# Patient Record
Sex: MALE | Race: WHITE | NOT HISPANIC OR LATINO | Employment: UNEMPLOYED | URBAN - METROPOLITAN AREA
[De-identification: names, ages, dates, MRNs, and addresses within clinical notes are randomized per-mention and may not be internally consistent; named-entity substitution may affect disease eponyms.]

---

## 2017-01-19 ENCOUNTER — ALLSCRIPTS OFFICE VISIT (OUTPATIENT)
Dept: OTHER | Facility: OTHER | Age: 37
End: 2017-01-19

## 2017-01-30 ENCOUNTER — ALLSCRIPTS OFFICE VISIT (OUTPATIENT)
Dept: OTHER | Facility: OTHER | Age: 37
End: 2017-01-30

## 2017-01-30 DIAGNOSIS — I10 ESSENTIAL (PRIMARY) HYPERTENSION: ICD-10-CM

## 2017-01-30 DIAGNOSIS — M54.16 RADICULOPATHY OF LUMBAR REGION: ICD-10-CM

## 2017-01-30 DIAGNOSIS — E55.9 VITAMIN D DEFICIENCY: ICD-10-CM

## 2017-02-06 ENCOUNTER — ALLSCRIPTS OFFICE VISIT (OUTPATIENT)
Dept: OTHER | Facility: OTHER | Age: 37
End: 2017-02-06

## 2017-02-24 ENCOUNTER — TRANSCRIBE ORDERS (OUTPATIENT)
Dept: MRI IMAGING | Facility: CLINIC | Age: 37
End: 2017-02-24
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
## 2018-01-11 NOTE — PROCEDURES
Procedures by Justina Gowers, RN at 9/23/2016  2:04 PM      Author:  Justina Gowers, RN Service:  (none) Author Type:  Registered Nurse     Filed:  9/23/2016  2:10 PM Date of Service:  9/23/2016  2:04 PM Status:  Signed     :  Justina Gowers, RN (Registered Nurse)         Procedure Orders:       1  Insert PICC line [[id number]] ordered by ELEUTERIO Olvera at 09/23/16 1142                    Insert PICC line  Date/Time: 9/23/2016 2:04 PM  Performed by: Eusebio Whitmore by: Francis Dunn     Patient location:  Bedside  Other Assisting Provider:  Yes (comment) (Vita Ivey RN)    Consent:     Consent obtained:  Written    Consent given by:  Parent    Procedural risks discussed: consent obtained by physician  [place] protocol:     Procedure explained and questions answered to patient or proxy's satisfaction: yes      Relevant documents present and verified: yes      Test results available and properly labeled: yes       Imaging studies available: yes      Required blood products, implants, devices, and special equipment available: yes      Site/side marked: yes      Immediately prior to procedure, a time out was called: yes      Patient identity confirmed:  Arm band  Pre-procedure details:     Hand hygiene: Hand hygiene performed prior to insertion      Sterile barrier technique: All elements of maximal sterile technique followed      Skin preparation:  2% chlorhexidine    Skin preparation agent: Skin preparation agent completely dried prior to procedure    Indications:     PICC line indications: vascular access    Anesthesia (see MAR for exact dosages):      Anesthesia method:  Local infiltration    Local anesthetic:  Lidocaine 1% w/o epi  Procedure details:     Location:  [place]    Vessel type: vein      Laterality:  Left    Approach: percutaneous technique used      Patient position:  Flat    Procedural supplies:  Triple lumen    Catheter size:  5 Fr    Landmarks identified: yes      Ultrasound guidance: yes      Sterile ultrasound techniques: Sterile gel and sterile probe covers were used      Number of attempts:  1    Successful placement: yes      Total catheter length (cm):  38cm    Catheter out on skin (cm):  0    Max flow rate:  999ml/hr    Arm circumference:  33cm  Post-procedure details:     Post-procedure:  Dressing applied and securement device placed    Assessment:  Blood return through all ports, free fluid flow and placement verification pending x-ray result    Post-procedure complications: none      Patient tolerance of procedure:   Tolerated well, no immediate complications  Comments:      [place] wire used to assist in placement                     Received for:Provider  EPIC   Sep 23 2016  2:11PM [facility] Standard Time
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
## 2018-01-14 VITALS
BODY MASS INDEX: 32.92 KG/M2 | RESPIRATION RATE: 16 BRPM | HEART RATE: 92 BPM | HEIGHT: 71 IN | WEIGHT: 235.13 LBS | SYSTOLIC BLOOD PRESSURE: 158 MMHG | DIASTOLIC BLOOD PRESSURE: 90 MMHG

## 2018-01-14 VITALS
SYSTOLIC BLOOD PRESSURE: 164 MMHG | HEART RATE: 98 BPM | HEIGHT: 71 IN | TEMPERATURE: 98.9 F | OXYGEN SATURATION: 98 % | DIASTOLIC BLOOD PRESSURE: 100 MMHG | RESPIRATION RATE: 18 BRPM | BODY MASS INDEX: 33.9 KG/M2 | WEIGHT: 242.13 LBS
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
## 2018-01-15 NOTE — MISCELLANEOUS
Message   Recorded as Task   Date: 12/02/2016 10:00 AM, Created By: Sonia Castaneda   Task Name: Call Back   Assigned To: Theresa Sanchez   Regarding Patient: Kim Vargas, Status: Active   Comment:    Theresa Sanchez - 02 Dec 2016 10:00 AM     TASK CREATED  Mother called to report pt has surgery on 12/8 and wanted to have an idea on when he would be able to go out to speech therapy  Suggested he stay in until his 2 week f/u and discuss at that time depending on his status  she was in agreement          Signatures   Electronically signed by : Hoang Rasmussen, ; Dec  2 2016 10:02AM EST                       (Author)
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
## 2018-01-22 VITALS
SYSTOLIC BLOOD PRESSURE: 139 MMHG | BODY MASS INDEX: 31.8 KG/M2 | RESPIRATION RATE: 16 BRPM | HEART RATE: 85 BPM | WEIGHT: 227.13 LBS | HEIGHT: 71 IN | TEMPERATURE: 98.2 F | DIASTOLIC BLOOD PRESSURE: 81 MMHG

## 2018-07-30 ENCOUNTER — DOCUMENTATION (OUTPATIENT)
Dept: INPATIENT UNIT | Facility: HOSPITAL | Age: 38
End: 2018-07-30

## 2020-06-08 ENCOUNTER — HOSPITAL ENCOUNTER (EMERGENCY)
Facility: HOSPITAL | Age: 40
Discharge: HOME | End: 2020-06-08
Attending: EMERGENCY MEDICINE
Payer: COMMERCIAL

## 2020-06-08 VITALS
BODY MASS INDEX: 30.94 KG/M2 | SYSTOLIC BLOOD PRESSURE: 169 MMHG | OXYGEN SATURATION: 98 % | WEIGHT: 221 LBS | HEIGHT: 71 IN | RESPIRATION RATE: 18 BRPM | HEART RATE: 99 BPM | TEMPERATURE: 98 F | DIASTOLIC BLOOD PRESSURE: 91 MMHG

## 2020-06-08 DIAGNOSIS — M54.6 THORACIC SPINE PAIN: Primary | ICD-10-CM

## 2020-06-08 DIAGNOSIS — R51.9 HEAD PAIN: ICD-10-CM

## 2020-06-08 PROCEDURE — 99282 EMERGENCY DEPT VISIT SF MDM: CPT | Performed by: EMERGENCY MEDICINE

## 2020-06-08 PROCEDURE — 99283 EMERGENCY DEPT VISIT LOW MDM: CPT

## 2020-07-13 ENCOUNTER — OFFICE VISIT (OUTPATIENT)
Dept: INTERNAL MEDICINE CLINIC | Facility: CLINIC | Age: 40
End: 2020-07-13
Payer: COMMERCIAL

## 2020-07-13 ENCOUNTER — PATIENT OUTREACH (OUTPATIENT)
Dept: INTERNAL MEDICINE CLINIC | Facility: CLINIC | Age: 40
End: 2020-07-13

## 2020-07-13 ENCOUNTER — TELEPHONE (OUTPATIENT)
Dept: PALLIATIVE MEDICINE | Facility: CLINIC | Age: 40
End: 2020-07-13

## 2020-07-13 VITALS
RESPIRATION RATE: 20 BRPM | SYSTOLIC BLOOD PRESSURE: 140 MMHG | DIASTOLIC BLOOD PRESSURE: 98 MMHG | OXYGEN SATURATION: 97 % | WEIGHT: 235 LBS | HEIGHT: 71 IN | HEART RATE: 121 BPM | TEMPERATURE: 97.9 F | BODY MASS INDEX: 32.9 KG/M2

## 2020-07-13 DIAGNOSIS — H49.02: ICD-10-CM

## 2020-07-13 DIAGNOSIS — Z98.890 S/P CRANIOTOMY: ICD-10-CM

## 2020-07-13 DIAGNOSIS — E78.2 MIXED HYPERLIPIDEMIA: ICD-10-CM

## 2020-07-13 DIAGNOSIS — G91.9 HYDROCEPHALUS, UNSPECIFIED TYPE (HCC): ICD-10-CM

## 2020-07-13 DIAGNOSIS — G89.29 CHRONIC LOW BACK PAIN WITHOUT SCIATICA, UNSPECIFIED BACK PAIN LATERALITY: ICD-10-CM

## 2020-07-13 DIAGNOSIS — F41.9 ANXIETY: ICD-10-CM

## 2020-07-13 DIAGNOSIS — G89.4 CHRONIC PAIN SYNDROME: Primary | ICD-10-CM

## 2020-07-13 DIAGNOSIS — Z11.4 SCREENING FOR HIV (HUMAN IMMUNODEFICIENCY VIRUS): ICD-10-CM

## 2020-07-13 DIAGNOSIS — F90.9 ATTENTION DEFICIT HYPERACTIVITY DISORDER (ADHD), UNSPECIFIED ADHD TYPE: ICD-10-CM

## 2020-07-13 DIAGNOSIS — I10 ESSENTIAL HYPERTENSION: ICD-10-CM

## 2020-07-13 DIAGNOSIS — S02.92XS MULTIPLE CLOSED FRACTURES OF FACIAL BONE, SEQUELA (HCC): ICD-10-CM

## 2020-07-13 DIAGNOSIS — M54.50 CHRONIC LOW BACK PAIN WITHOUT SCIATICA, UNSPECIFIED BACK PAIN LATERALITY: ICD-10-CM

## 2020-07-13 DIAGNOSIS — R56.9 SEIZURES (HCC): ICD-10-CM

## 2020-07-13 DIAGNOSIS — Z87.820 HISTORY OF TRAUMATIC BRAIN INJURY: ICD-10-CM

## 2020-07-13 PROCEDURE — 99204 OFFICE O/P NEW MOD 45 MIN: CPT | Performed by: INTERNAL MEDICINE

## 2020-07-13 PROCEDURE — 3077F SYST BP >= 140 MM HG: CPT | Performed by: INTERNAL MEDICINE

## 2020-07-13 PROCEDURE — 3080F DIAST BP >= 90 MM HG: CPT | Performed by: INTERNAL MEDICINE

## 2020-07-13 PROCEDURE — 93000 ELECTROCARDIOGRAM COMPLETE: CPT | Performed by: INTERNAL MEDICINE

## 2020-07-13 PROCEDURE — 3008F BODY MASS INDEX DOCD: CPT | Performed by: INTERNAL MEDICINE

## 2020-07-13 RX ORDER — GABAPENTIN 100 MG/1
100 CAPSULE ORAL 3 TIMES DAILY
COMMUNITY
End: 2020-08-03 | Stop reason: SINTOL

## 2020-07-13 RX ORDER — AMITRIPTYLINE HYDROCHLORIDE 150 MG/1
150 TABLET, FILM COATED ORAL
COMMUNITY

## 2020-07-13 RX ORDER — HYDROCHLOROTHIAZIDE 12.5 MG/1
12.5 CAPSULE, GELATIN COATED ORAL DAILY
COMMUNITY
End: 2021-09-16 | Stop reason: SDUPTHER

## 2020-07-13 NOTE — PATIENT INSTRUCTIONS
Chronic Hypertension   AMBULATORY CARE:   Hypertension  is high blood pressure (BP)  Your BP is the force of your blood moving against the walls of your arteries  Normal BP is less than 120/80  Prehypertension is between 120/80 and 139/89  Hypertension is 140/90 or higher  Hypertension causes your BP to get so high that your heart has to work much harder than normal  This can damage your heart  Chronic hypertension is a long-term condition that you can control with a healthy lifestyle or medicines  A controlled blood pressure helps protect your organs, such as your heart, lungs, brain, and kidneys  Common symptoms include the following:   · Headache     · Blurred vision    · Chest pain     · Dizziness or weakness     · Trouble breathing     · Nosebleeds  Call 911 for any of the following:   · You have discomfort in your chest that feels like squeezing, pressure, fullness, or pain  · You become confused or have difficulty speaking  · You suddenly feel lightheaded or have trouble breathing  · You have pain or discomfort in your back, neck, jaw, stomach, or arm  Seek care immediately if:   · You have a severe headache or vision loss  · You have weakness in an arm or leg  Contact your healthcare provider if:   · You feel faint, dizzy, confused, or drowsy  · You have been taking your BP medicine and your BP is still higher than your healthcare provider says it should be  · You have questions or concerns about your condition or care  Treatment for chronic hypertension  may include medicine to lower your BP and lower your cholesterol level  A low cholesterol level helps prevent heart disease and makes it easier to control your blood pressure  Heart disease can make your blood pressure harder to control  You may also need to make lifestyle changes  Take your medicine exactly as directed    Manage chronic hypertension:  Talk with your healthcare provider about these and other ways to manage hypertension:  · Take your BP at home  Sit and rest for 5 minutes before you take your BP  Extend your arm and support it on a flat surface  Your arm should be at the same level as your heart  Follow the directions that came with your BP monitor  If possible, take at least 2 BP readings each time  Take your BP at least twice a day at the same times each day, such as morning and evening  Keep a record of your BP readings and bring it to your follow-up visits  Ask your healthcare provider what your blood pressure should be  · Limit sodium (salt) as directed  Too much sodium can affect your fluid balance  Check labels to find low-sodium or no-salt-added foods  Some low-sodium foods use potassium salts for flavor  Too much potassium can also cause health problems  Your healthcare provider will tell you how much sodium and potassium are safe for you to have in a day  He or she may recommend that you limit sodium to 2,300 mg a day  · Follow the meal plan recommended by your healthcare provider  A dietitian or your provider can give you more information on low-sodium plans or the DASH (Dietary Approaches to Stop Hypertension) eating plan  The DASH plan is low in sodium, unhealthy fats, and total fat  It is high in potassium, calcium, and fiber  · Exercise to maintain a healthy weight  Exercise at least 30 minutes per day, on most days of the week  This will help decrease your blood pressure  Ask about the best exercise plan for you  · Decrease stress  This may help lower your BP  Learn ways to relax, such as deep breathing or listening to music  · Limit alcohol  Women should limit alcohol to 1 drink a day  Men should limit alcohol to 2 drinks a day  A drink of alcohol is 12 ounces of beer, 5 ounces of wine, or 1½ ounces of liquor  · Do not smoke  Nicotine and other chemicals in cigarettes and cigars can increase your BP and also cause lung damage   Ask your healthcare provider for information if you currently smoke and need help to quit  E-cigarettes or smokeless tobacco still contain nicotine  Talk to your healthcare provider before you use these products  Follow up with your healthcare provider as directed: You will need to return to have your BP checked and to have other lab tests done  Write down your questions so you remember to ask them during your visits  © 2017 [address] Information is for End User's use only and may not be sold, redistributed or otherwise used for commercial purposes  All illustrations and images included in CareNotes® are the copyrighted property of [employer]  or Jorge Fernandez  The above information is an  only  It is not intended as medical advice for individual conditions or treatments  Talk to your doctor, nurse or pharmacist before following any medical regimen to see if it is safe and effective for you  Obesity   AMBULATORY CARE:   Obesity  is when your body mass index (BMI) is greater than 30  Your healthcare provider will use your height and weight to measure your BMI  The risks of obesity include  many health problems, such as injuries or physical disability  You may need tests to check for the following:  · Diabetes     · High blood pressure or high cholesterol     · Heart disease     · Gallbladder or liver disease     · Cancer of the colon, breast, prostate, liver, or kidney     · Sleep apnea     · Arthritis or gout  Seek care immediately if:   · You have a severe headache, confusion, or difficulty speaking  · You have weakness on one side of your body  · You have chest pain, sweating, or shortness of breath  Contact your healthcare provider if:   · You have symptoms of gallbladder or liver disease, such as pain in your upper abdomen  · You have knee or hip pain and discomfort while walking  · You have symptoms of diabetes, such as intense hunger and thirst, and frequent urination  · You have symptoms of sleep apnea, such as snoring or daytime sleepiness  · You have questions or concerns about your condition or care  Treatment for obesity  focuses on helping you lose weight to improve your health  Even a small decrease in BMI can reduce the risk for many health problems  Your healthcare provider will help you set a weight-loss goal   · Lifestyle changes  are the first step in treating obesity  These include making healthy food choices and getting regular physical activity  Your healthcare provider may suggest a weight-loss program that involves coaching, education, and therapy  · Medicine  may help you lose weight when it is used with a healthy diet and physical activity  · Surgery  can help you lose weight if you are very obese and have other health problems  There are several types of weight-loss surgery  Ask your healthcare provider for more information  Be successful losing weight:   · Set small, realistic goals  An example of a small goal is to walk for 20 minutes 5 days a week  Anther goal is to lose 5% of your body weight  · Tell friends, family members, and coworkers about your goals  and ask for their support  Ask a friend to lose weight with you, or join a weight-loss support group  · Identify foods or triggers that may cause you to overeat , and find ways to avoid them  Remove tempting high-calorie foods from your home and workplace  Place a bowl of fresh fruit on your kitchen counter  If stress causes you to eat, then find other ways to cope with stress  · Keep a diary to track what you eat and drink  Also write down how many minutes of physical activity you do each day  Weigh yourself once a week and record it in your diary  Eating changes: You will need to eat 500 to 1,000 fewer calories each day than you currently eat to lose 1 to 2 pounds a week  The following changes will help you cut calories:  · Eat smaller portions    Use small plates, no larger than 9 inches in diameter  Fill your plate half full of fruits and vegetables  Measure your food using measuring cups until you know what a serving size looks like  · Eat 3 meals and 1 or 2 snacks each day  Plan your meals in advance  Alla Laughter and eat at home most of the time  Eat slowly  · Eat fruits and vegetables at every meal   They are low in calories and high in fiber, which makes you feel full  Do not add butter, margarine, or cream sauce to vegetables  Use herbs to season steamed vegetables  · Eat less fat and fewer fried foods  Eat more baked or grilled chicken and fish  These protein sources are lower in calories and fat than red meat  Limit fast food  Dress your salads with olive oil and vinegar instead of bottled dressing  · Limit the amount of sugar you eat  Do not drink sugary beverages  Limit alcohol  Activity changes:  Physical activity is good for your body in many ways  It helps you burn calories and build strong muscles  It decreases stress and depression, and improves your mood  It can also help you sleep better  Talk to your healthcare provider before you begin an exercise program   · Exercise for at least 30 minutes 5 days a week  Start slowly  Set aside time each day for physical activity that you enjoy and that is convenient for you  It is best to do both weight training and an activity that increases your heart rate, such as walking, bicycling, or swimming  · Find ways to be more active  Do yard work and housecleaning  Walk up the stairs instead of using elevators  Spend your leisure time going to events that require walking, such as outdoor festivals or fairs  This extra physical activity can help you lose weight and keep it off  Follow up with your healthcare provider as directed: You may need to meet with a dietitian  Write down your questions so you remember to ask them during your visits     © 2017 [address] Information is for End User's use only and may not be sold, redistributed or otherwise used for commercial purposes  All illustrations and images included in CareNotes® are the copyrighted property of [employer]  or Jorge Fernandez  The above information is an  only  It is not intended as medical advice for individual conditions or treatments  Talk to your doctor, nurse or pharmacist before following any medical regimen to see if it is safe and effective for you  Low Fat Diet   AMBULATORY CARE:   A low-fat diet  is an eating plan that is low in total fat, unhealthy fat, and cholesterol  You may need to follow a low-fat diet if you have trouble digesting or absorbing fat  You may also need to follow this diet if you have high cholesterol  You can also lower your cholesterol by increasing the amount of fiber in your diet  Soluble fiber is a type of fiber that helps to decrease cholesterol levels  Different types of fat in food:   · Limit unhealthy fats  A diet that is high in cholesterol, saturated fat, and trans fat may cause unhealthy cholesterol levels  Unhealthy cholesterol levels increase your risk of heart disease  ¨ Cholesterol:  Limit intake of cholesterol to less than 200 mg per day  Cholesterol is found in meat, eggs, and dairy  ¨ Saturated fat:  Limit saturated fat to less than 7% of your total daily calories  Ask your dietitian how many calories you need each day  Saturated fat is found in butter, cheese, ice cream, whole milk, and palm oil  Saturated fat is also found in meat, such as beef, pork, chicken skin, and processed meats  Processed meats include sausage, hot dogs, and bologna  ¨ Trans fat:  Avoid trans fat as much as possible  Trans fat is used in fried and baked foods  Foods that say trans fat free on the label may still have up to 0 5 grams of trans fat per serving  · Include healthy fats  Replace foods that are high in saturated and trans fat with foods high in healthy fats  This may help to decrease high cholesterol levels  ¨ Monounsaturated fats: These are found in avocados, nuts, and vegetable oils, such as olive, canola, and sunflower oil  ¨ Polyunsaturated fats: These can be found in vegetable oils, such as soybean or corn oil  Omega-3 fats can help to decrease the risk of heart disease  Omega-3 fats are found in fish, such as salmon, herring, trout, and tuna  Omega-3 fats can also be found in plant foods, such as walnuts, flaxseed, soybeans, and canola oil    Foods to limit or avoid:   · Grains:      ¨ Snacks that are made with partially hydrogenated oils, such as chips, regular crackers, and butter-flavored popcorn    ¨ High-fat baked goods, such as biscuits, croissants, doughnuts, pies, cookies, and pastries    · Dairy:      ¨ Whole milk, 2% milk, and yogurt and ice cream made with whole milk    ¨ Half and half creamer, heavy cream, and whipping cream    ¨ Cheese, cream cheese, and sour cream    · Meats and proteins:      ¨ High-fat cuts of meat (T-bone steak, regular hamburger, and ribs)    ¨ Fried meat, poultry (turkey and chicken), and fish    ¨ Poultry (chicken and turkey) with skin    ¨ Cold cuts (salami or bologna), hot dogs, kim, and sausage    ¨ Whole eggs and egg yolks    · Vegetables and fruits with added fat:      ¨ Fried vegetables or vegetables in butter or high-fat sauces, such as cream or cheese sauces    ¨ Fried fruit or fruit served with butter or cream    · Fats:      ¨ Butter, stick margarine, and shortening    ¨ Coconut, palm oil, and palm kernel oil  Foods to include:   · Grains:      ¨ Whole-grain breads, cereals, pasta, and brown rice    ¨ Low-fat crackers and pretzels    · Vegetables and fruits:      ¨ Fresh, frozen, or canned vegetables (no salt or low-sodium)    ¨ Fresh, frozen, dried, or canned fruit (canned in light syrup or fruit juice)    ¨ Avocado    · Low-fat dairy products:      ¨ Nonfat (skim) or 1% milk    ¨ Nonfat or low-fat cheese, yogurt, and cottage cheese    · Meats and proteins:      ¨ Chicken or turkey with no skin    ¨ Baked or broiled fish    ¨ Lean beef and pork (loin, round, extra lean hamburger)    ¨ Beans and peas, unsalted nuts, soy products    ¨ Egg whites and substitutes    ¨ Seeds and nuts    · Fats:      ¨ Unsaturated oil, such as canola, olive, peanut, soybean, or sunflower oil    ¨ Soft or liquid margarine and vegetable oil spread    ¨ Low-fat salad dressing  Other ways to decrease fat:   · Read food labels before you buy foods  Choose foods that have less than 30% of calories from fat  Choose low-fat or fat-free dairy products  Remember that fat free does not mean calorie free  These foods still contain calories, and too many calories can lead to weight gain  · Trim fat from meat and avoid fried food  Trim all visible fat from meat before you cook it  Remove the skin from poultry  Do not [place] meat, fish, or poultry  Bake, roast, boil, or broil these foods instead  Avoid fried foods  Eat a baked potato instead of [place] fries  Steam vegetables instead of sautéing them in butter  · Add less fat to foods  Use imitation kim bits on salads and baked potatoes instead of regular kim bits  Use fat-free or low-fat salad dressings instead of regular dressings  Use low-fat or nonfat butter-flavored topping instead of regular butter or margarine on popcorn and other foods  Ways to decrease fat in recipes:  Replace high-fat ingredients with low-fat or nonfat ones  This may cause baked goods to be drier than usual  You may need to use nonfat cooking spray on pans to prevent food from sticking  You also may need to change the amount of other ingredients, such as water, in the recipe  Try the following:  · Use low-fat or light margarine instead of regular margarine or shortening  · Use lean ground turkey breast or chicken, or lean ground beef (less than 5% fat) instead of hamburger       · Add 1 teaspoon of canola oil to 8 ounces of skim milk instead of using cream or half and half  · Use grated zucchini, carrots, or apples in breads instead of coconut  · Use blenderized, low-fat cottage cheese, plain tofu, or low-fat ricotta cheese instead of cream cheese  · Use 1 egg white and 1 teaspoon of canola oil, or use ¼ cup (2 ounces) of fat-free egg substitute instead of a whole egg  · Replace half of the oil that is called for in a recipe with applesauce when you bake  Use 3 tablespoons of cocoa powder and 1 tablespoon of canola oil instead of a square of baking chocolate  How to increase fiber:  Eat enough high-fiber foods to get 20 to 30 grams of fiber every day  Slowly increase your fiber intake to avoid stomach cramps, gas, and other problems  · Eat 3 ounces of whole-grain foods each day  An ounce is about 1 slice of bread  Eat whole-grain breads, such as whole-wheat bread  Whole wheat, whole-wheat flour, or other whole grains should be listed as the first ingredient on the food label  Replace white flour with whole-grain flour or use half of each in recipes  Whole-grain flour is heavier than white flour, so you may have to add more yeast or baking powder  · Eat a high-fiber cereal for breakfast   Oatmeal is a good source of soluble fiber  Look for cereals that have bran or fiber in the name  Choose whole-grain products, such as brown rice, barley, and whole-wheat pasta  · Eat more beans, peas, and lentils  For example, add beans to soups or salads  Eat at least 5 cups of fruits and vegetables each day  Eat fruits and vegetables with the peel because the peel is high in fiber  © 2017 [address]  Information is for End User's use only and may not be sold, redistributed or otherwise used for commercial purposes  All illustrations and images included in CareNotes® are the copyrighted property of [employer]  or Jorge Fernandez  The above information is an  only   It is not intended as medical advice for individual conditions or treatments  Talk to your doctor, nurse or pharmacist before following any medical regimen to see if it is safe and effective for you  Heart Healthy Diet   AMBULATORY CARE:   A heart healthy diet  is an eating plan low in total fat, unhealthy fats, and sodium (salt)  A heart healthy diet helps decrease your risk for heart disease and stroke  Limit the amount of fat you eat to 25% to 35% of your total daily calories  Limit sodium to less than 2,300 mg each day  Healthy fats:  Healthy fats can help improve cholesterol levels  The risk for heart disease is decreased when cholesterol levels are normal  Choose healthy fats, such as the following:  · Unsaturated fat  is found in foods such as soybean, canola, olive, corn, and safflower oils  It is also found in soft tub margarine that is made with liquid vegetable oil  · Omega-3 fat  is found in certain fish, such as salmon, tuna, and trout, and in walnuts and flaxseed  Unhealthy fats:  Unhealthy fats can cause unhealthy cholesterol levels in your blood and increase your risk of heart disease  Limit unhealthy fats, such as the following:  · Cholesterol  is found in animal foods, such as eggs and lobster, and in dairy products made from whole milk  Limit cholesterol to less than 300 milligrams (mg) each day  You may need to limit cholesterol to 200 mg each day if you have heart disease  · Saturated fat  is found in meats, such as kim and hamburger  It is also found in chicken or turkey skin, whole milk, and butter  Limit saturated fat to less than 7% of your total daily calories  Limit saturated fat to less than 6% if you have heart disease or are at increased risk for it  · Trans fat  is found in packaged foods, such as potato chips and cookies  It is also in hard margarine, some fried foods, and shortening  Avoid trans fats as much as possible    Heart healthy foods and drinks to include:  Ask your dietitian or healthcare provider how many servings to have from each of the following food groups:  · Grains:      ¨ Whole-wheat breads, cereals, and pastas, and brown rice    ¨ Low-fat, low-sodium crackers and chips    · Vegetables:      ¨ Broccoli, green beans, green peas, and spinach    ¨ Collards, kale, and lima beans    ¨ Carrots, sweet potatoes, tomatoes, and peppers    ¨ Canned vegetables with no salt added    · Fruits:      ¨ Bananas, peaches, pears, and pineapple    ¨ Grapes, raisins, and dates    ¨ Oranges, tangerines, grapefruit, orange juice, and grapefruit juice    ¨ Apricots, mangoes, melons, and papaya    ¨ Raspberries and strawberries    ¨ Canned fruit with no added sugar    · Low-fat dairy products:      ¨ Nonfat (skim) milk, 1% milk, and low-fat almond, cashew, or soy milks fortified with calcium    ¨ Low-fat cheese, regular or frozen yogurt, and cottage cheese    · Meats and proteins , such as lean cuts of beef and pork (loin, leg, round), skinless chicken and turkey, legumes, soy products, egg whites, and nuts  Foods and drinks to limit or avoid:  Ask your dietitian or healthcare provider about these and other foods that are high in unhealthy fat, sodium, and sugar:  · Snack or packaged foods , such as frozen dinners, cookies, macaroni and cheese, and cereals with more than 300 mg of sodium per serving    · Canned or dry mixes  for cakes, soups, sauces, or gravies    · Vegetables with added sodium , such as instant potatoes, vegetables with added sauces, or regular canned vegetables    · Other foods high in sodium , such as ketchup, barbecue sauce, salad dressing, pickles, olives, soy sauce, and miso    · High-fat dairy foods  such as whole or 2% milk, cream cheese, or sour cream, and cheeses     · High-fat protein foods  such as high-fat cuts of beef (T-bone steaks, ribs), chicken or turkey with skin, and organ meats, such as liver    · Cured or smoked meats , such as hot dogs, kim, and sausage    · Unhealthy fats and oils , such as butter, stick margarine, shortening, and cooking oils such as coconut or palm oil    · Food and drinks high in sugar , such as soft drinks (soda), sports drinks, sweetened tea, candy, cake, cookies, pies, and doughnuts  Other diet guidelines to follow:   · Eat more foods containing omega-3 fats  Eat fish high in omega-3 fats at least 2 times a week  · Limit alcohol  Too much alcohol can damage your heart and raise your blood pressure  Women should limit alcohol to 1 drink a day  Men should limit alcohol to 2 drinks a day  A drink of alcohol is 12 ounces of beer, 5 ounces of wine, or 1½ ounces of liquor  · Choose low-sodium foods  High-sodium foods can lead to high blood pressure  Add little or no salt to food you prepare  Use herbs and spices in place of salt  · Eat more fiber  to help lower cholesterol levels  Eat at least 5 servings of fruits and vegetables each day  Eat 3 ounces of whole-grain foods each day  Legumes (beans) are also a good source of fiber  Lifestyle guidelines:   · Do not smoke  Nicotine and other chemicals in cigarettes and cigars can cause lung and heart damage  Ask your healthcare provider for information if you currently smoke and need help to quit  E-cigarettes or smokeless tobacco still contain nicotine  Talk to your healthcare provider before you use these products  · Exercise regularly  to help you maintain a healthy weight and improve your blood pressure and cholesterol levels  Ask your healthcare provider about the best exercise plan for you  Do not start an exercise program without asking your healthcare provider  Follow up with your healthcare provider as directed:  Write down your questions so you remember to ask them during your visits  © 2017 [facility] Information is for End User's use only and may not be sold, redistributed or otherwise used for commercial purposes   All illustrations and images included in CareNotes® are the copyrighted property of [employer]  or Jorge Fernandez  The above information is an  only  It is not intended as medical advice for individual conditions or treatments  Talk to your doctor, nurse or pharmacist before following any medical regimen to see if it is safe and effective for you  Obesity   AMBULATORY CARE:   Obesity  is when your body mass index (BMI) is greater than 30  Your healthcare provider will use your height and weight to measure your BMI  The risks of obesity include  many health problems, such as injuries or physical disability  You may need tests to check for the following:  · Diabetes     · High blood pressure or high cholesterol     · Heart disease     · Gallbladder or liver disease     · Cancer of the colon, breast, prostate, liver, or kidney     · Sleep apnea     · Arthritis or gout  Seek care immediately if:   · You have a severe headache, confusion, or difficulty speaking  · You have weakness on one side of your body  · You have chest pain, sweating, or shortness of breath  Contact your healthcare provider if:   · You have symptoms of gallbladder or liver disease, such as pain in your upper abdomen  · You have knee or hip pain and discomfort while walking  · You have symptoms of diabetes, such as intense hunger and thirst, and frequent urination  · You have symptoms of sleep apnea, such as snoring or daytime sleepiness  · You have questions or concerns about your condition or care  Treatment for obesity  focuses on helping you lose weight to improve your health  Even a small decrease in BMI can reduce the risk for many health problems  Your healthcare provider will help you set a weight-loss goal   · Lifestyle changes  are the first step in treating obesity  These include making healthy food choices and getting regular physical activity   Your healthcare provider may suggest a weight-loss program that involves coaching, education, and therapy  · Medicine  may help you lose weight when it is used with a healthy diet and physical activity  · Surgery  can help you lose weight if you are very obese and have other health problems  There are several types of weight-loss surgery  Ask your healthcare provider for more information  Be successful losing weight:   · Set small, realistic goals  An example of a small goal is to walk for 20 minutes 5 days a week  Anther goal is to lose 5% of your body weight  · Tell friends, family members, and coworkers about your goals  and ask for their support  Ask a friend to lose weight with you, or join a weight-loss support group  · Identify foods or triggers that may cause you to overeat , and find ways to avoid them  Remove tempting high-calorie foods from your home and workplace  Place a bowl of fresh fruit on your kitchen counter  If stress causes you to eat, then find other ways to cope with stress  · Keep a diary to track what you eat and drink  Also write down how many minutes of physical activity you do each day  Weigh yourself once a week and record it in your diary  Eating changes: You will need to eat 500 to 1,000 fewer calories each day than you currently eat to lose 1 to 2 pounds a week  The following changes will help you cut calories:  · Eat smaller portions  Use small plates, no larger than 9 inches in diameter  Fill your plate half full of fruits and vegetables  Measure your food using measuring cups until you know what a serving size looks like  · Eat 3 meals and 1 or 2 snacks each day  Plan your meals in advance  Eliza Torres and eat at home most of the time  Eat slowly  · Eat fruits and vegetables at every meal   They are low in calories and high in fiber, which makes you feel full  Do not add butter, margarine, or cream sauce to vegetables  Use herbs to season steamed vegetables  · Eat less fat and fewer fried foods    Eat more baked or grilled chicken and fish  These protein sources are lower in calories and fat than red meat  Limit fast food  Dress your salads with olive oil and vinegar instead of bottled dressing  · Limit the amount of sugar you eat  Do not drink sugary beverages  Limit alcohol  Activity changes:  Physical activity is good for your body in many ways  It helps you burn calories and build strong muscles  It decreases stress and depression, and improves your mood  It can also help you sleep better  Talk to your healthcare provider before you begin an exercise program   · Exercise for at least 30 minutes 5 days a week  Start slowly  Set aside time each day for physical activity that you enjoy and that is convenient for you  It is best to do both weight training and an activity that increases your heart rate, such as walking, bicycling, or swimming  · Find ways to be more active  Do yard work and housecleaning  Walk up the stairs instead of using elevators  Spend your leisure time going to events that require walking, such as outdoor festivals or fairs  This extra physical activity can help you lose weight and keep it off  Follow up with your healthcare provider as directed: You may need to meet with a dietitian  Write down your questions so you remember to ask them during your visits  © 2017 [address] Information is for End User's use only and may not be sold, redistributed or otherwise used for commercial purposes  All illustrations and images included in CareNotes® are the copyrighted property of [email or web address]  or Jorge Fernandez  The above information is an  only  It is not intended as medical advice for individual conditions or treatments  Talk to your doctor, nurse or pharmacist before following any medical regimen to see if it is safe and effective for you      Low Fat Diet   AMBULATORY CARE:   A low-fat diet  is an eating plan that is low in total fat, unhealthy fat, and cholesterol  You may need to follow a low-fat diet if you have trouble digesting or absorbing fat  You may also need to follow this diet if you have high cholesterol  You can also lower your cholesterol by increasing the amount of fiber in your diet  Soluble fiber is a type of fiber that helps to decrease cholesterol levels  Different types of fat in food:   · Limit unhealthy fats  A diet that is high in cholesterol, saturated fat, and trans fat may cause unhealthy cholesterol levels  Unhealthy cholesterol levels increase your risk of heart disease  ¨ Cholesterol:  Limit intake of cholesterol to less than 200 mg per day  Cholesterol is found in meat, eggs, and dairy  ¨ Saturated fat:  Limit saturated fat to less than 7% of your total daily calories  Ask your dietitian how many calories you need each day  Saturated fat is found in butter, cheese, ice cream, whole milk, and palm oil  Saturated fat is also found in meat, such as beef, pork, chicken skin, and processed meats  Processed meats include sausage, hot dogs, and bologna  ¨ Trans fat:  Avoid trans fat as much as possible  Trans fat is used in fried and baked foods  Foods that say trans fat free on the label may still have up to 0 5 grams of trans fat per serving  · Include healthy fats  Replace foods that are high in saturated and trans fat with foods high in healthy fats  This may help to decrease high cholesterol levels  ¨ Monounsaturated fats: These are found in avocados, nuts, and vegetable oils, such as olive, canola, and sunflower oil  ¨ Polyunsaturated fats: These can be found in vegetable oils, such as soybean or corn oil  Omega-3 fats can help to decrease the risk of heart disease  Omega-3 fats are found in fish, such as salmon, herring, trout, and tuna  Omega-3 fats can also be found in plant foods, such as walnuts, flaxseed, soybeans, and canola oil    Foods to limit or avoid:   · Grains:      ¨ Snacks that are made with partially hydrogenated oils, such as chips, regular crackers, and butter-flavored popcorn    ¨ High-fat baked goods, such as biscuits, croissants, doughnuts, pies, cookies, and pastries    · Dairy:      ¨ Whole milk, 2% milk, and yogurt and ice cream made with whole milk    ¨ Half and half creamer, heavy cream, and whipping cream    ¨ Cheese, cream cheese, and sour cream    · Meats and proteins:      ¨ High-fat cuts of meat (T-bone steak, regular hamburger, and ribs)    ¨ Fried meat, poultry (turkey and chicken), and fish    ¨ Poultry (chicken and turkey) with skin    ¨ Cold cuts (salami or bologna), hot dogs, kim, and sausage    ¨ Whole eggs and egg yolks    · Vegetables and fruits with added fat:      ¨ Fried vegetables or vegetables in butter or high-fat sauces, such as cream or cheese sauces    ¨ Fried fruit or fruit served with butter or cream    · Fats:      ¨ Butter, stick margarine, and shortening    ¨ Coconut, palm oil, and palm kernel oil  Foods to include:   · Grains:      ¨ Whole-grain breads, cereals, pasta, and brown rice    ¨ Low-fat crackers and pretzels    · Vegetables and fruits:      ¨ Fresh, frozen, or canned vegetables (no salt or low-sodium)    ¨ Fresh, frozen, dried, or canned fruit (canned in light syrup or fruit juice)    ¨ Avocado    · Low-fat dairy products:      ¨ Nonfat (skim) or 1% milk    ¨ Nonfat or low-fat cheese, yogurt, and cottage cheese    · Meats and proteins:      ¨ Chicken or turkey with no skin    ¨ Baked or broiled fish    ¨ Lean beef and pork (loin, round, extra lean hamburger)    ¨ Beans and peas, unsalted nuts, soy products    ¨ Egg whites and substitutes    ¨ Seeds and nuts    · Fats:      ¨ Unsaturated oil, such as canola, olive, peanut, soybean, or sunflower oil    ¨ Soft or liquid margarine and vegetable oil spread    ¨ Low-fat salad dressing  Other ways to decrease fat:   · Read food labels before you buy foods    Choose foods that have less than 30% of calories from fat  Choose low-fat or fat-free dairy products  Remember that fat free does not mean calorie free  These foods still contain calories, and too many calories can lead to weight gain  · Trim fat from meat and avoid fried food  Trim all visible fat from meat before you cook it  Remove the skin from poultry  Do not [place] meat, fish, or poultry  Bake, roast, boil, or broil these foods instead  Avoid fried foods  Eat a baked potato instead of [place] fries  Steam vegetables instead of sautéing them in butter  · Add less fat to foods  Use imitation kim bits on salads and baked potatoes instead of regular kim bits  Use fat-free or low-fat salad dressings instead of regular dressings  Use low-fat or nonfat butter-flavored topping instead of regular butter or margarine on popcorn and other foods  Ways to decrease fat in recipes:  Replace high-fat ingredients with low-fat or nonfat ones  This may cause baked goods to be drier than usual  You may need to use nonfat cooking spray on pans to prevent food from sticking  You also may need to change the amount of other ingredients, such as water, in the recipe  Try the following:  · Use low-fat or light margarine instead of regular margarine or shortening  · Use lean ground turkey breast or chicken, or lean ground beef (less than 5% fat) instead of hamburger  · Add 1 teaspoon of canola oil to 8 ounces of skim milk instead of using cream or half and half  · Use grated zucchini, carrots, or apples in breads instead of coconut  · Use blenderized, low-fat cottage cheese, plain tofu, or low-fat ricotta cheese instead of cream cheese  · Use 1 egg white and 1 teaspoon of canola oil, or use ¼ cup (2 ounces) of fat-free egg substitute instead of a whole egg  · Replace half of the oil that is called for in a recipe with applesauce when you bake   Use 3 tablespoons of cocoa powder and 1 tablespoon of canola oil instead of a square of baking chocolate  How to increase fiber:  Eat enough high-fiber foods to get 20 to 30 grams of fiber every day  Slowly increase your fiber intake to avoid stomach cramps, gas, and other problems  · Eat 3 ounces of whole-grain foods each day  An ounce is about 1 slice of bread  Eat whole-grain breads, such as whole-wheat bread  Whole wheat, whole-wheat flour, or other whole grains should be listed as the first ingredient on the food label  Replace white flour with whole-grain flour or use half of each in recipes  Whole-grain flour is heavier than white flour, so you may have to add more yeast or baking powder  · Eat a high-fiber cereal for breakfast   Oatmeal is a good source of soluble fiber  Look for cereals that have bran or fiber in the name  Choose whole-grain products, such as brown rice, barley, and whole-wheat pasta  · Eat more beans, peas, and lentils  For example, add beans to soups or salads  Eat at least 5 cups of fruits and vegetables each day  Eat fruits and vegetables with the peel because the peel is high in fiber  © 2017 [address]  Information is for End User's use only and may not be sold, redistributed or otherwise used for commercial purposes  All illustrations and images included in CareNotes® are the copyrighted property of [employer]  or Jorge Fernandez  The above information is an  only  It is not intended as medical advice for individual conditions or treatments  Talk to your doctor, nurse or pharmacist before following any medical regimen to see if it is safe and effective for you  Heart Healthy Diet   AMBULATORY CARE:   A heart healthy diet  is an eating plan low in total fat, unhealthy fats, and sodium (salt)  A heart healthy diet helps decrease your risk for heart disease and stroke  Limit the amount of fat you eat to 25% to 35% of your total daily calories  Limit sodium to less than 2,300 mg each day     Healthy fats:  Healthy fats can help improve cholesterol levels  The risk for heart disease is decreased when cholesterol levels are normal  Choose healthy fats, such as the following:  · Unsaturated fat  is found in foods such as soybean, canola, olive, corn, and safflower oils  It is also found in soft tub margarine that is made with liquid vegetable oil  · Omega-3 fat  is found in certain fish, such as salmon, tuna, and trout, and in walnuts and flaxseed  Unhealthy fats:  Unhealthy fats can cause unhealthy cholesterol levels in your blood and increase your risk of heart disease  Limit unhealthy fats, such as the following:  · Cholesterol  is found in animal foods, such as eggs and lobster, and in dairy products made from whole milk  Limit cholesterol to less than 300 milligrams (mg) each day  You may need to limit cholesterol to 200 mg each day if you have heart disease  · Saturated fat  is found in meats, such as kim and hamburger  It is also found in chicken or turkey skin, whole milk, and butter  Limit saturated fat to less than 7% of your total daily calories  Limit saturated fat to less than 6% if you have heart disease or are at increased risk for it  · Trans fat  is found in packaged foods, such as potato chips and cookies  It is also in hard margarine, some fried foods, and shortening  Avoid trans fats as much as possible    Heart healthy foods and drinks to include:  Ask your dietitian or healthcare provider how many servings to have from each of the following food groups:  · Grains:      ¨ Whole-wheat breads, cereals, and pastas, and brown rice    ¨ Low-fat, low-sodium crackers and chips    · Vegetables:      ¨ Broccoli, green beans, green peas, and spinach    ¨ Collards, kale, and lima beans    ¨ Carrots, sweet potatoes, tomatoes, and peppers    ¨ Canned vegetables with no salt added    · Fruits:      ¨ Bananas, peaches, pears, and pineapple    ¨ Grapes, raisins, and dates    ¨ Oranges, tangerines, grapefruit, orange juice, and grapefruit juice    ¨ Apricots, mangoes, melons, and papaya    ¨ Raspberries and strawberries    ¨ Canned fruit with no added sugar    · Low-fat dairy products:      ¨ Nonfat (skim) milk, 1% milk, and low-fat almond, cashew, or soy milks fortified with calcium    ¨ Low-fat cheese, regular or frozen yogurt, and cottage cheese    · Meats and proteins , such as lean cuts of beef and pork (loin, leg, round), skinless chicken and turkey, legumes, soy products, egg whites, and nuts  Foods and drinks to limit or avoid:  Ask your dietitian or healthcare provider about these and other foods that are high in unhealthy fat, sodium, and sugar:  · Snack or packaged foods , such as frozen dinners, cookies, macaroni and cheese, and cereals with more than 300 mg of sodium per serving    · Canned or dry mixes  for cakes, soups, sauces, or gravies    · Vegetables with added sodium , such as instant potatoes, vegetables with added sauces, or regular canned vegetables    · Other foods high in sodium , such as ketchup, barbecue sauce, salad dressing, pickles, olives, soy sauce, and miso    · High-fat dairy foods  such as whole or 2% milk, cream cheese, or sour cream, and cheeses     · High-fat protein foods  such as high-fat cuts of beef (T-bone steaks, ribs), chicken or turkey with skin, and organ meats, such as liver    · Cured or smoked meats , such as hot dogs, kim, and sausage    · Unhealthy fats and oils , such as butter, stick margarine, shortening, and cooking oils such as coconut or palm oil    · Food and drinks high in sugar , such as soft drinks (soda), sports drinks, sweetened tea, candy, cake, cookies, pies, and doughnuts  Other diet guidelines to follow:   · Eat more foods containing omega-3 fats  Eat fish high in omega-3 fats at least 2 times a week  · Limit alcohol  Too much alcohol can damage your heart and raise your blood pressure  Women should limit alcohol to 1 drink a day   Men should limit alcohol to 2 drinks a day  A drink of alcohol is 12 ounces of beer, 5 ounces of wine, or 1½ ounces of liquor  · Choose low-sodium foods  High-sodium foods can lead to high blood pressure  Add little or no salt to food you prepare  Use herbs and spices in place of salt  · Eat more fiber  to help lower cholesterol levels  Eat at least 5 servings of fruits and vegetables each day  Eat 3 ounces of whole-grain foods each day  Legumes (beans) are also a good source of fiber  Lifestyle guidelines:   · Do not smoke  Nicotine and other chemicals in cigarettes and cigars can cause lung and heart damage  Ask your healthcare provider for information if you currently smoke and need help to quit  E-cigarettes or smokeless tobacco still contain nicotine  Talk to your healthcare provider before you use these products  · Exercise regularly  to help you maintain a healthy weight and improve your blood pressure and cholesterol levels  Ask your healthcare provider about the best exercise plan for you  Do not start an exercise program without asking your healthcare provider  Follow up with your healthcare provider as directed:  Write down your questions so you remember to ask them during your visits  © 2017 [address] [facility] Information is for End User's use only and may not be sold, redistributed or otherwise used for commercial purposes  All illustrations and images included in CareNotes® are the copyrighted property of [employer]  or Jorge Fernandez  The above information is an  only  It is not intended as medical advice for individual conditions or treatments  Talk to your doctor, nurse or pharmacist before following any medical regimen to see if it is safe and effective for you

## 2020-07-13 NOTE — PROGRESS NOTES
Outpatient Care Management Note:  Received referral from PCP  Chart review completed  Reached out to palliative care to make them aware of the referral as requested by PCP

## 2020-07-13 NOTE — TELEPHONE ENCOUNTER
Dr Rubén Leger referring pt to [address]  Please review chart to help determine if pt is Palliative Care appropriate      Thank you
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
## 2020-07-14 NOTE — TELEPHONE ENCOUNTER
Patient is not appropriate for palliative care  He should pursue referral to pain management for his chronic pain  Thank you

## 2020-07-16 LAB
CREAT ?TM UR-SCNC: 135 UMOL/L
EXT MICROALBUMIN URINE RANDOM: 1.2
EXTERNAL HIV CONFIRMATION: NORMAL
EXTERNAL HIV SCREEN: NORMAL
MICROALBUMIN/CREAT UR: 9 MG/G{CREAT}

## 2020-07-17 ENCOUNTER — PATIENT OUTREACH (OUTPATIENT)
Dept: INTERNAL MEDICINE CLINIC | Facility: CLINIC | Age: 40
End: 2020-07-17

## 2020-07-17 NOTE — PROGRESS NOTES
I called patient spoke with patient mother gave Dr Linda Newell recommendation she states will speak to son and call us back if son wants to schedule
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
## 2020-07-24 ENCOUNTER — OFFICE VISIT (OUTPATIENT)
Dept: PAIN MEDICINE | Facility: CLINIC | Age: 40
End: 2020-07-24
Payer: COMMERCIAL

## 2020-07-24 ENCOUNTER — TELEPHONE (OUTPATIENT)
Dept: PAIN MEDICINE | Facility: CLINIC | Age: 40
End: 2020-07-24

## 2020-07-24 VITALS
WEIGHT: 242.4 LBS | HEIGHT: 71 IN | RESPIRATION RATE: 20 BRPM | DIASTOLIC BLOOD PRESSURE: 102 MMHG | BODY MASS INDEX: 33.94 KG/M2 | SYSTOLIC BLOOD PRESSURE: 172 MMHG | HEART RATE: 107 BPM

## 2020-07-24 DIAGNOSIS — Z98.890 S/P CRANIOTOMY: ICD-10-CM

## 2020-07-24 DIAGNOSIS — G89.4 CHRONIC PAIN SYNDROME: Primary | ICD-10-CM

## 2020-07-24 DIAGNOSIS — F11.20 UNCOMPLICATED OPIOID DEPENDENCE (HCC): ICD-10-CM

## 2020-07-24 DIAGNOSIS — Z79.891 LONG-TERM CURRENT USE OF OPIATE ANALGESIC: ICD-10-CM

## 2020-07-24 PROCEDURE — 80305 DRUG TEST PRSMV DIR OPT OBS: CPT | Performed by: ANESTHESIOLOGY

## 2020-07-24 PROCEDURE — 3008F BODY MASS INDEX DOCD: CPT | Performed by: ANESTHESIOLOGY

## 2020-07-24 PROCEDURE — 99213 OFFICE O/P EST LOW 20 MIN: CPT | Performed by: ANESTHESIOLOGY

## 2020-07-24 PROCEDURE — 3077F SYST BP >= 140 MM HG: CPT | Performed by: ANESTHESIOLOGY

## 2020-07-24 PROCEDURE — 3080F DIAST BP >= 90 MM HG: CPT | Performed by: ANESTHESIOLOGY

## 2020-07-24 RX ORDER — MORPHINE SULFATE 15 MG/1
15 TABLET ORAL EVERY 6 HOURS PRN
COMMUNITY
End: 2020-11-20

## 2020-07-24 RX ORDER — OXYCODONE HYDROCHLORIDE AND ACETAMINOPHEN 5; 325 MG/1; MG/1
1 TABLET ORAL EVERY 6 HOURS PRN
COMMUNITY
End: 2020-07-24 | Stop reason: SDUPTHER

## 2020-07-24 RX ORDER — OXYCODONE HYDROCHLORIDE AND ACETAMINOPHEN 5; 325 MG/1; MG/1
1 TABLET ORAL EVERY 8 HOURS PRN
Qty: 45 TABLET | Refills: 0 | Status: SHIPPED | OUTPATIENT
Start: 2020-07-24 | End: 2020-08-07 | Stop reason: SDUPTHER

## 2020-07-24 RX ORDER — OXYCODONE HYDROCHLORIDE 5 MG/1
5 TABLET ORAL EVERY 4 HOURS PRN
COMMUNITY
End: 2020-07-24

## 2020-07-24 NOTE — TELEPHONE ENCOUNTER
Mother states that since beginning gabapentin she has noticed patient repeats himself a lot and wanted SI to be aware   No other side effects reported
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
## 2020-07-24 NOTE — TELEPHONE ENCOUNTER
Patient's mom calling to confirm should he continue to take Gabapentin now that he is going to start taking   oxyCODONE-acetaminophen (PERCOCET) 5-325 mg per tablet       Please advise  Thank you       [phone]

## 2020-07-24 NOTE — TELEPHONE ENCOUNTER
S/w mother per MADISON  Advised that gabapentin should not be abruptly stopped and ov note does not state that med was discontinued  Advised pt should continue on as ordered until SI returns to office Monday  Mother verbalized understanding  Do you want pt to continue on or wean off at this time?

## 2020-07-26 NOTE — TELEPHONE ENCOUNTER
We can try it again and see how he does   If any untoward side effects persist, we will consider tapering down or perhaps d/c

## 2020-07-27 NOTE — TELEPHONE ENCOUNTER
S/w mother and advised same  Pt will continue on as ordered and cb if any side effects  Pt will f/u as schedued with [employer]

## 2020-07-28 LAB
6MAM UR QL CFM: NEGATIVE NG/ML
7AMINOCLONAZEPAM UR QL CFM: NEGATIVE NG/ML
A-OH ALPRAZ UR QL CFM: NEGATIVE NG/ML
ACCEPTABLE CREAT UR QL: NORMAL MG/DL
ACCEPTIBLE SP GR UR QL: NORMAL
AMPHET UR QL CFM: NEGATIVE NG/ML
AMPHET UR QL CFM: NEGATIVE NG/ML
BUPRENORPHINE UR QL CFM: NEGATIVE NG/ML
BUTALBITAL UR QL CFM: NEGATIVE NG/ML
BZE UR QL CFM: NEGATIVE NG/ML
CODEINE UR QL CFM: NEGATIVE NG/ML
DESIPRAMINE UR QL CFM: NEGATIVE NG/ML
DESIPRAMINE UR QL CFM: NEGATIVE NG/ML
EDDP UR QL CFM: NEGATIVE NG/ML
ETHYL GLUCURONIDE UR QL CFM: NEGATIVE NG/ML
ETHYL SULFATE UR QL SCN: NEGATIVE NG/ML
FENTANYL UR QL CFM: NEGATIVE NG/ML
GLIADIN IGG SER IA-ACNC: NEGATIVE NG/ML
GLUCOSE 30M P 50 G LAC PO SERPL-MCNC: NEGATIVE NG/ML
HYDROCODONE UR QL CFM: NEGATIVE NG/ML
HYDROCODONE UR QL CFM: NEGATIVE NG/ML
HYDROMORPHONE UR QL CFM: ABNORMAL NG/ML
HYDROMORPHONE UR QL CFM: NORMAL
IMIPRAMINE UR QL CFM: NEGATIVE NG/ML
MDMA UR QL CFM: NEGATIVE NG/ML
ME-PHENIDATE UR QL CFM: NEGATIVE NG/ML
MEPERIDINE UR QL CFM: NEGATIVE NG/ML
METHADONE UR QL CFM: NEGATIVE NG/ML
METHAMPHET UR QL CFM: NEGATIVE NG/ML
MORPHINE UR QL CFM: ABNORMAL NG/ML
MORPHINE UR QL CFM: ABNORMAL NG/ML
NITRITE UR QL: NORMAL UG/ML
NORBUPRENORPHINE UR QL CFM: NEGATIVE NG/ML
NORDIAZEPAM UR QL CFM: NEGATIVE NG/ML
NORFENTANYL UR QL CFM: NEGATIVE NG/ML
NORHYDROCODONE UR QL CFM: NEGATIVE NG/ML
NORHYDROCODONE UR QL CFM: NEGATIVE NG/ML
NORMEPERIDINE UR QL CFM: NEGATIVE NG/ML
NOROXYCODONE UR QL CFM: ABNORMAL NG/ML
OLANZAPINE QUANTIFICATION: NEGATIVE NG/ML
OPC-3373 QUANTIFICATION: NEGATIVE
OXAZEPAM UR QL CFM: NEGATIVE NG/ML
OXYCODONE UR QL CFM: ABNORMAL NG/ML
OXYMORPHONE UR QL CFM: ABNORMAL NG/ML
OXYMORPHONE UR QL CFM: ABNORMAL NG/ML
PCP UR QL CFM: NEGATIVE NG/ML
PHENOBARB UR QL CFM: NEGATIVE NG/ML
PROLACTIN SERPL-MCNC: NEGATIVE NG/ML
RESULT ALL_PRESCRIBED MEDS AND SPECIAL INSTRUCTIONS: NORMAL
SL AMB 3-METHYL-FENTANYL QUANTIFICATION: NORMAL NG/ML
SL AMB 4-ANPP QUANTIFICATION: NORMAL NG/ML
SL AMB 4-FIBF QUANTIFICATION: NORMAL NG/ML
SL AMB 7-OH-MITRAGYNINE (KRATOM ALKALOID) QUANTIFICATION: NEGATIVE NG/ML
SL AMB ACETYL FENTANYL QUANTIFICATION: NORMAL NG/ML
SL AMB ACETYL NORFENTANYL QUANTIFICATION: NORMAL NG/ML
SL AMB ACRYL FENTANYL QUANTIFICATION: NORMAL NG/ML
SL AMB BUTRYL FENTANYL QUANTIFICATION: NORMAL NG/ML
SL AMB CARFENTANIL QUANTIFICATION: NORMAL NG/ML
SL AMB CLOZAPINE QUANTIFICATION: NEGATIVE NG/ML
SL AMB CTHC (MARIJUANA METABOLITE) QUANTIFICATION: NEGATIVE NG/ML
SL AMB CYCLOPROPYL FENTANYL QUANTIFICATION: NORMAL NG/ML
SL AMB DEXTROMETHORPHAN QUANTIFICATION: NEGATIVE NG/ML
SL AMB DEXTRORPHAN (DEXTROMETHORPHAN METABOLITE) QUANT: NEGATIVE NG/ML
SL AMB DEXTRORPHAN (DEXTROMETHORPHAN METABOLITE) QUANT: NEGATIVE NG/ML
SL AMB FURANYL FENTANYL QUANTIFICATION: NORMAL NG/ML
SL AMB HYDROXYRISPERIDONE QUANTIFICATION: NEGATIVE NG/ML
SL AMB METHOXYACETYL FENTANYL QUANTIFICATION: NORMAL NG/ML
SL AMB N-DESMETHYL U-47700 QUANTIFICATION: NORMAL NG/ML
SL AMB N-DESMETHYL-TRAMADOL QUANTIFICATION: NEGATIVE NG/ML
SL AMB N-DESMETHYLCLOZAPINE QUANTIFICATION: NEGATIVE NG/ML
SL AMB PHENTERMINE QUANTIFICATION: NEGATIVE NG/ML
SL AMB RISPERIDONE QUANTIFICATION: NEGATIVE NG/ML
SL AMB RITALINIC ACID QUANTIFICATION: NEGATIVE NG/ML
SL AMB U-47700 QUANTIFICATION: NORMAL NG/ML
SPECIMEN PH ACCEPTABLE UR: NORMAL
TAPENTADOL UR QL CFM: NEGATIVE NG/ML
TEMAZEPAM UR QL CFM: NEGATIVE NG/ML
TEMAZEPAM UR QL CFM: NEGATIVE NG/ML
TRAMADOL UR QL CFM: NEGATIVE NG/ML
URATE/CREAT 24H UR: NEGATIVE NG/ML

## 2020-07-31 ENCOUNTER — PATIENT OUTREACH (OUTPATIENT)
Dept: INTERNAL MEDICINE CLINIC | Facility: CLINIC | Age: 40
End: 2020-07-31

## 2020-08-03 ENCOUNTER — DOCUMENTATION (OUTPATIENT)
Dept: PAIN MEDICINE | Facility: CLINIC | Age: 40
End: 2020-08-03

## 2020-08-03 NOTE — TELEPHONE ENCOUNTER
LUISA-  S/w pt's mom, his legal guardian, she said the pt was becoming very aggressive and yelling/screaming while on the Gabapentin  Pt has stopped the Gabapentin 100 mg BID due to the way it has made his mood  Pt doing very well on the Percocet and will remain on that until his follow up appt on 8/21 with Lindsey Meraz   Will remove Gabapentin from med list

## 2020-08-03 NOTE — TELEPHONE ENCOUNTER
Pt's Mom called in to say that the Gabapentin is not working for him but oxyCODONE-acetaminophen (PERCOCET) 5-325 mg per  Is working for him   Please be advise thank you      Mom's  call back # [phone]

## 2020-08-04 ENCOUNTER — TELEPHONE (OUTPATIENT)
Dept: PAIN MEDICINE | Facility: CLINIC | Age: 40
End: 2020-08-04

## 2020-08-04 NOTE — TELEPHONE ENCOUNTER
Jenn Compton called stating she would like to s/w nurse   Pt state he has enough but she would prefer to give message to nurse       C/b# [phone]

## 2020-08-04 NOTE — TELEPHONE ENCOUNTER
S/W Dana Abbott and advised Rx written on 7/24/2020 was for 15 days, so pt should have enough til Friday 8/7/2020  Moved appt from 8/21/2020 to 8/7/2020  Dana Sat will have to make sure ride is available    Advised would not refill without OV

## 2020-08-04 NOTE — TELEPHONE ENCOUNTER
Pt contacted Call Center requested refill of their medication  Medication Name:  oxyCODONE-acetaminophen (PERCOCET)     Dosage of Med:5-325 mg       Frequency of Med:Take 1 tablet by mouth every 8 (eight) hours as needed for moderate pain for up to 15 daysMax Daily Amount: 3 tablets       Remaining Medication: 1 day       Pharmacy and Location:  [place] [phone]      Pt   Preferred Callback Phone Number:  [phone]

## 2020-08-05 NOTE — TELEPHONE ENCOUNTER
S/w pt's mom, she said the pt has more pills than he thought, RN asked how many and she said she is unsure because the pt is sleeping  She asked if a rx can be sent to the pharmacy tomorrow, RN explained pt needs to come in for appt on Friday and he needs to bring his pill bottle

## 2020-08-07 ENCOUNTER — PATIENT OUTREACH (OUTPATIENT)
Dept: CASE MANAGEMENT | Facility: OTHER | Age: 40
End: 2020-08-07

## 2020-08-07 ENCOUNTER — OFFICE VISIT (OUTPATIENT)
Dept: PAIN MEDICINE | Facility: CLINIC | Age: 40
End: 2020-08-07
Payer: COMMERCIAL

## 2020-08-07 VITALS
TEMPERATURE: 98.5 F | HEART RATE: 109 BPM | RESPIRATION RATE: 18 BRPM | WEIGHT: 238.2 LBS | SYSTOLIC BLOOD PRESSURE: 133 MMHG | HEIGHT: 71 IN | DIASTOLIC BLOOD PRESSURE: 84 MMHG | BODY MASS INDEX: 33.35 KG/M2

## 2020-08-07 DIAGNOSIS — G89.4 CHRONIC PAIN SYNDROME: Primary | ICD-10-CM

## 2020-08-07 DIAGNOSIS — Z79.891 LONG-TERM CURRENT USE OF OPIATE ANALGESIC: ICD-10-CM

## 2020-08-07 DIAGNOSIS — F11.20 UNCOMPLICATED OPIOID DEPENDENCE (HCC): ICD-10-CM

## 2020-08-07 DIAGNOSIS — Z98.890 S/P CRANIOTOMY: ICD-10-CM

## 2020-08-07 PROCEDURE — 3008F BODY MASS INDEX DOCD: CPT | Performed by: NURSE PRACTITIONER

## 2020-08-07 PROCEDURE — 99214 OFFICE O/P EST MOD 30 MIN: CPT | Performed by: NURSE PRACTITIONER

## 2020-08-07 PROCEDURE — 3075F SYST BP GE 130 - 139MM HG: CPT | Performed by: NURSE PRACTITIONER

## 2020-08-07 PROCEDURE — 3079F DIAST BP 80-89 MM HG: CPT | Performed by: NURSE PRACTITIONER

## 2020-08-07 RX ORDER — OXYCODONE HYDROCHLORIDE AND ACETAMINOPHEN 5; 325 MG/1; MG/1
1 TABLET ORAL EVERY 8 HOURS PRN
Qty: 90 TABLET | Refills: 0 | Status: SHIPPED | OUTPATIENT
Start: 2020-08-07 | End: 2020-10-01

## 2020-08-07 RX ORDER — OXYCODONE HYDROCHLORIDE AND ACETAMINOPHEN 5; 325 MG/1; MG/1
1 TABLET ORAL EVERY 8 HOURS PRN
Qty: 90 TABLET | Refills: 0 | Status: SHIPPED | OUTPATIENT
Start: 2020-08-07 | End: 2020-08-07 | Stop reason: SDUPTHER
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
## 2020-08-07 NOTE — PROGRESS NOTES
Outpatient Care Management Note:  Returned call not received after last outreach  I reviewing the chart, Andriette Presume is being seen and treated by pain management,  He is also switching his PCP to a [address] [place] [address] Road  Closing care management episode at this time

## 2020-08-07 NOTE — PATIENT INSTRUCTIONS
Opioid Dependence   WHAT YOU NEED TO KNOW:   What is opioid dependence? Opioids are medicines, such as morphine and codeine, used to treat pain  Dependence happens after you have used opioids regularly for a long period of time  Dependence means that your body gets used to how much medicine you take  Dependence is not the same as addiction  Addiction means that a person uses opioids to get high instead of using them to control pain  What are the signs and symptoms of opioid dependence? · You need more of the opioid to get the same amount of pain relief as you did when you first started taking it  · You have tried to use less opioid medicine but are not able to  · You have withdrawal symptoms when you take less of the opioid  What are the signs and symptoms of opioid withdrawal?  You may have the following signs and symptoms if you suddenly stop taking opioids or if you decrease the amount you normally take:  · Yawning     · Runny nose     · Nausea or vomiting     · Diarrhea     · Chills or goosebumps    · Muscle aches or cramps     · Anxiety  How is opioid dependence diagnosed? Your healthcare provider will do a physical exam  He will ask you questions about your symptoms and your use of opioids  He will also ask about your current and past use of other drugs and any family history of drug abuse  How is opioid dependence treated? You may be treated in a hospital or you may be treated as an outpatient  During detoxification (detox), healthcare providers will slowly decrease your dose of the opioid medicine you are dependent on  They may use another opioid medicine such as methadone to decrease symptoms of withdrawal  You may need to take this or another medicine for some time  Your healthcare provider will also replace the opioid with another pain medicine that is less likely to cause dependence  He may also suggest that you receive counseling and social support during treatment     What are the risks of opioid dependence? There is a risk of overdose during early treatment with methadone  You may become dependent on the medicines used to treat opioid dependence  Without treatment, you may develop other health problems or become addicted to opioids  Your risk of abusing alcohol and other drugs increases  You may also develop risky behaviors that can lead to an overdose, violence, and suicidal thoughts  When should I contact my healthcare provider? · Your speech is slurred  · You have difficulty staying awake  · You have nausea and vomiting  · You are easily upset or cry easily  · You have poor balance  · You have questions or concerns about your condition or care  When should I seek immediate care or call 911? · You feel lightheaded or faint  · You have a fast, slow, or irregular heartbeat  · You have a seizure  CARE AGREEMENT:   You have the right to help plan your care  Learn about your health condition and how it may be treated  Discuss treatment options with your caregivers to decide what care you want to receive  You always have the right to refuse treatment  The above information is an  only  It is not intended as medical advice for individual conditions or treatments  Talk to your doctor, nurse or pharmacist before following any medical regimen to see if it is safe and effective for you  © 2017 [employer]  Information is for End User's use only and may not be sold, redistributed or otherwise used for commercial purposes  All illustrations and images included in CareNotes® are the copyrighted property of A D A M [employer]  or Jorge Fernandez

## 2020-08-20 ENCOUNTER — TELEPHONE (OUTPATIENT)
Dept: INTERNAL MEDICINE CLINIC | Facility: CLINIC | Age: 40
End: 2020-08-20

## 2020-10-01 ENCOUNTER — OFFICE VISIT (OUTPATIENT)
Dept: PAIN MEDICINE | Facility: CLINIC | Age: 40
End: 2020-10-01
Payer: COMMERCIAL

## 2020-10-01 VITALS
RESPIRATION RATE: 18 BRPM | HEART RATE: 101 BPM | TEMPERATURE: 98.7 F | SYSTOLIC BLOOD PRESSURE: 174 MMHG | BODY MASS INDEX: 32.73 KG/M2 | DIASTOLIC BLOOD PRESSURE: 102 MMHG | WEIGHT: 233.8 LBS | HEIGHT: 71 IN

## 2020-10-01 DIAGNOSIS — F11.20 UNCOMPLICATED OPIOID DEPENDENCE (HCC): ICD-10-CM

## 2020-10-01 DIAGNOSIS — Z98.890 S/P CRANIOTOMY: ICD-10-CM

## 2020-10-01 DIAGNOSIS — Z79.891 LONG-TERM CURRENT USE OF OPIATE ANALGESIC: ICD-10-CM

## 2020-10-01 DIAGNOSIS — G89.4 CHRONIC PAIN SYNDROME: Primary | ICD-10-CM

## 2020-10-01 PROCEDURE — 3077F SYST BP >= 140 MM HG: CPT | Performed by: NURSE PRACTITIONER

## 2020-10-01 PROCEDURE — 99214 OFFICE O/P EST MOD 30 MIN: CPT | Performed by: NURSE PRACTITIONER

## 2020-10-01 PROCEDURE — 3080F DIAST BP >= 90 MM HG: CPT | Performed by: NURSE PRACTITIONER

## 2020-10-01 RX ORDER — OXYCODONE HYDROCHLORIDE 5 MG/1
5 TABLET ORAL EVERY 4 HOURS PRN
Qty: 30 TABLET | Refills: 0 | Status: SHIPPED | OUTPATIENT
Start: 2020-10-01 | End: 2020-10-01 | Stop reason: SDUPTHER

## 2020-10-01 RX ORDER — OXYCODONE HYDROCHLORIDE 5 MG/1
5 TABLET ORAL EVERY 4 HOURS PRN
Qty: 30 TABLET | Refills: 0 | Status: SHIPPED | OUTPATIENT
Start: 2020-10-01 | End: 2020-10-02

## 2020-10-02 ENCOUNTER — TELEPHONE (OUTPATIENT)
Dept: PAIN MEDICINE | Facility: MEDICAL CENTER | Age: 40
End: 2020-10-02

## 2020-10-02 DIAGNOSIS — G89.4 CHRONIC PAIN SYNDROME: ICD-10-CM

## 2020-10-02 RX ORDER — OXYCODONE HYDROCHLORIDE 5 MG/1
5 TABLET ORAL EVERY 4 HOURS PRN
Qty: 90 TABLET | Refills: 0 | Status: SHIPPED | OUTPATIENT
Start: 2020-10-02 | End: 2020-10-05

## 2020-10-02 RX ORDER — OXYCODONE HYDROCHLORIDE 5 MG/1
5 TABLET ORAL EVERY 4 HOURS PRN
Qty: 90 TABLET | Refills: 0 | Status: SHIPPED | OUTPATIENT
Start: 2020-10-02 | End: 2020-10-02 | Stop reason: SDUPTHER

## 2020-10-05 RX ORDER — OXYCODONE HYDROCHLORIDE 5 MG/1
5 TABLET ORAL EVERY 4 HOURS PRN
Qty: 90 TABLET | Refills: 0 | Status: SHIPPED | OUTPATIENT
Start: 2020-10-05 | End: 2020-11-20 | Stop reason: SDUPTHER

## 2020-10-08 ENCOUNTER — TELEPHONE (OUTPATIENT)
Dept: PAIN MEDICINE | Facility: CLINIC | Age: 40
End: 2020-10-08

## 2020-10-15 ENCOUNTER — TELEPHONE (OUTPATIENT)
Dept: PAIN MEDICINE | Facility: CLINIC | Age: 40
End: 2020-10-15

## 2020-10-19 ENCOUNTER — TELEPHONE (OUTPATIENT)
Dept: PAIN MEDICINE | Facility: MEDICAL CENTER | Age: 40
End: 2020-10-19

## 2020-10-22 ENCOUNTER — TELEPHONE (OUTPATIENT)
Dept: PAIN MEDICINE | Facility: CLINIC | Age: 40
End: 2020-10-22

## 2020-11-20 ENCOUNTER — OFFICE VISIT (OUTPATIENT)
Dept: PAIN MEDICINE | Facility: CLINIC | Age: 40
End: 2020-11-20
Payer: COMMERCIAL

## 2020-11-20 VITALS
DIASTOLIC BLOOD PRESSURE: 82 MMHG | SYSTOLIC BLOOD PRESSURE: 132 MMHG | TEMPERATURE: 98.5 F | HEART RATE: 124 BPM | RESPIRATION RATE: 18 BRPM

## 2020-11-20 DIAGNOSIS — G89.4 CHRONIC PAIN SYNDROME: Primary | ICD-10-CM

## 2020-11-20 DIAGNOSIS — G89.29 CHRONIC BILATERAL LOW BACK PAIN WITHOUT SCIATICA: ICD-10-CM

## 2020-11-20 DIAGNOSIS — F11.20 UNCOMPLICATED OPIOID DEPENDENCE (HCC): ICD-10-CM

## 2020-11-20 DIAGNOSIS — Z79.891 LONG-TERM CURRENT USE OF OPIATE ANALGESIC: ICD-10-CM

## 2020-11-20 DIAGNOSIS — M54.50 CHRONIC BILATERAL LOW BACK PAIN WITHOUT SCIATICA: ICD-10-CM

## 2020-11-20 DIAGNOSIS — S82.841D CLOSED BIMALLEOLAR FRACTURE OF RIGHT ANKLE WITH ROUTINE HEALING, SUBSEQUENT ENCOUNTER: ICD-10-CM

## 2020-11-20 PROCEDURE — 99214 OFFICE O/P EST MOD 30 MIN: CPT | Performed by: NURSE PRACTITIONER

## 2020-11-20 PROCEDURE — 80305 DRUG TEST PRSMV DIR OPT OBS: CPT | Performed by: NURSE PRACTITIONER

## 2020-11-20 RX ORDER — BENAZEPRIL HYDROCHLORIDE AND HYDROCHLOROTHIAZIDE 20; 12.5 MG/1; MG/1
1 TABLET ORAL DAILY
COMMUNITY

## 2020-11-20 RX ORDER — OXYCODONE HYDROCHLORIDE 5 MG/1
5 TABLET ORAL EVERY 4 HOURS PRN
Qty: 120 TABLET | Refills: 0 | Status: SHIPPED | OUTPATIENT
Start: 2020-11-20 | End: 2020-12-18 | Stop reason: SDUPTHER

## 2020-11-20 RX ORDER — CHOLECALCIFEROL (VITAMIN D3) 50 MCG
CAPSULE ORAL
COMMUNITY

## 2020-11-24 LAB

## 2020-12-18 ENCOUNTER — OFFICE VISIT (OUTPATIENT)
Dept: PAIN MEDICINE | Facility: CLINIC | Age: 40
End: 2020-12-18
Payer: COMMERCIAL

## 2020-12-18 VITALS
WEIGHT: 231 LBS | BODY MASS INDEX: 32.34 KG/M2 | HEIGHT: 71 IN | HEART RATE: 110 BPM | DIASTOLIC BLOOD PRESSURE: 99 MMHG | RESPIRATION RATE: 18 BRPM | SYSTOLIC BLOOD PRESSURE: 164 MMHG

## 2020-12-18 DIAGNOSIS — M54.50 CHRONIC BILATERAL LOW BACK PAIN WITHOUT SCIATICA: ICD-10-CM

## 2020-12-18 DIAGNOSIS — G89.4 CHRONIC PAIN SYNDROME: Primary | ICD-10-CM

## 2020-12-18 DIAGNOSIS — G89.29 CHRONIC BILATERAL LOW BACK PAIN WITHOUT SCIATICA: ICD-10-CM

## 2020-12-18 DIAGNOSIS — Z79.891 LONG-TERM CURRENT USE OF OPIATE ANALGESIC: ICD-10-CM

## 2020-12-18 DIAGNOSIS — F11.20 UNCOMPLICATED OPIOID DEPENDENCE (HCC): ICD-10-CM

## 2020-12-18 PROCEDURE — 3008F BODY MASS INDEX DOCD: CPT | Performed by: NURSE PRACTITIONER

## 2020-12-18 PROCEDURE — 99214 OFFICE O/P EST MOD 30 MIN: CPT | Performed by: NURSE PRACTITIONER

## 2020-12-18 RX ORDER — OXYCODONE HYDROCHLORIDE 5 MG/1
TABLET ORAL
Qty: 120 TABLET | Refills: 0 | Status: SHIPPED | OUTPATIENT
Start: 2020-12-18 | End: 2020-12-18 | Stop reason: SDUPTHER

## 2020-12-18 RX ORDER — OXYCODONE HYDROCHLORIDE 5 MG/1
TABLET ORAL
Qty: 120 TABLET | Refills: 0 | Status: SHIPPED | OUTPATIENT
Start: 2020-12-18 | End: 2021-01-22 | Stop reason: SDUPTHER

## 2021-01-18 NOTE — TELEPHONE ENCOUNTER
S/w pt's mother  States he had sx for 2 brain aneurysms on 1/15 at [facility] with Dr Gucci Jordan  States pt is taking oxycodone every 6 hours as ordered but still having a lot of surgical pain  States pt can not take NSAIDS or tylenol  Asking if pt can increase oxycodone at this time  Advised [employer] out of office til tomorrow and will cb tomorrow in regards to increasing oxycodone

## 2021-01-18 NOTE — TELEPHONE ENCOUNTER
Melia Garcia- mother  [phone]  Estefania    Patients mother is calling in stating that her son needs a refill  Patient had head sx on 1/15/21  Taylor Mcneil Pt contacted Call Center requested refill of their medication          Medication Name: oxyCODONE (ROXICODONE)     Dosage of Med: 5 mg       Frequency of Med: 4xs a day     Remaining Medication: he will be out in 2 days    Pharm[facility] and [facility]

## 2021-01-18 NOTE — TELEPHONE ENCOUNTER
Pt mother called stating if it is possible to increase pt script because of his surgery       Pt can be reached at [phone]

## 2021-01-22 ENCOUNTER — OFFICE VISIT (OUTPATIENT)
Dept: PAIN MEDICINE | Facility: CLINIC | Age: 41
End: 2021-01-22
Payer: COMMERCIAL

## 2021-01-22 VITALS
DIASTOLIC BLOOD PRESSURE: 93 MMHG | BODY MASS INDEX: 32.34 KG/M2 | HEART RATE: 106 BPM | WEIGHT: 231 LBS | HEIGHT: 71 IN | SYSTOLIC BLOOD PRESSURE: 138 MMHG | RESPIRATION RATE: 18 BRPM

## 2021-01-22 DIAGNOSIS — Z79.891 LONG-TERM CURRENT USE OF OPIATE ANALGESIC: ICD-10-CM

## 2021-01-22 DIAGNOSIS — Z87.820 HISTORY OF TRAUMATIC BRAIN INJURY: ICD-10-CM

## 2021-01-22 DIAGNOSIS — G89.4 CHRONIC PAIN SYNDROME: Primary | ICD-10-CM

## 2021-01-22 DIAGNOSIS — F11.20 UNCOMPLICATED OPIOID DEPENDENCE (HCC): ICD-10-CM

## 2021-01-22 DIAGNOSIS — Z98.890 S/P CRANIOTOMY: ICD-10-CM

## 2021-01-22 PROCEDURE — 3080F DIAST BP >= 90 MM HG: CPT | Performed by: NURSE PRACTITIONER

## 2021-01-22 PROCEDURE — 3075F SYST BP GE 130 - 139MM HG: CPT | Performed by: NURSE PRACTITIONER

## 2021-01-22 PROCEDURE — 80305 DRUG TEST PRSMV DIR OPT OBS: CPT | Performed by: NURSE PRACTITIONER

## 2021-01-22 PROCEDURE — 99214 OFFICE O/P EST MOD 30 MIN: CPT | Performed by: NURSE PRACTITIONER

## 2021-01-22 PROCEDURE — 3008F BODY MASS INDEX DOCD: CPT | Performed by: NURSE PRACTITIONER

## 2021-01-22 RX ORDER — OXYCODONE HYDROCHLORIDE 5 MG/1
TABLET ORAL
Qty: 120 TABLET | Refills: 0 | Status: SHIPPED | OUTPATIENT
Start: 2021-01-22 | End: 2021-01-22 | Stop reason: SDUPTHER

## 2021-01-22 RX ORDER — OXYCODONE HYDROCHLORIDE 5 MG/1
TABLET ORAL
Qty: 120 TABLET | Refills: 0 | Status: SHIPPED | OUTPATIENT
Start: 2021-01-22 | End: 2021-03-19 | Stop reason: SDUPTHER
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
## 2021-01-26 LAB

## 2021-02-09 NOTE — TELEPHONE ENCOUNTER
Gina-patient's mother called requesting to speak to a nurse regarding patient's Percocet medication   Please advise, sanam    Call back# [phone]

## 2021-02-09 NOTE — TELEPHONE ENCOUNTER
S/W Siobhan Hooks who requested I speak with pt regarding pain medication refill and that he can only get refill from [address]  Pt under impression that his surgeon was also going to give him refills  Advised patient this was not allowed  Pt verbalized understanding    Advised pt he has refill from [facility] dated 2/19/21 and 3/17/21

## 2021-03-19 ENCOUNTER — OFFICE VISIT (OUTPATIENT)
Dept: PAIN MEDICINE | Facility: CLINIC | Age: 41
End: 2021-03-19
Payer: COMMERCIAL

## 2021-03-19 VITALS
HEIGHT: 71 IN | HEART RATE: 102 BPM | BODY MASS INDEX: 33.32 KG/M2 | RESPIRATION RATE: 18 BRPM | WEIGHT: 238 LBS | SYSTOLIC BLOOD PRESSURE: 151 MMHG | DIASTOLIC BLOOD PRESSURE: 95 MMHG

## 2021-03-19 DIAGNOSIS — G89.29 CHRONIC BILATERAL LOW BACK PAIN WITHOUT SCIATICA: ICD-10-CM

## 2021-03-19 DIAGNOSIS — Z98.890 S/P CRANIOTOMY: ICD-10-CM

## 2021-03-19 DIAGNOSIS — Z79.891 LONG-TERM CURRENT USE OF OPIATE ANALGESIC: ICD-10-CM

## 2021-03-19 DIAGNOSIS — F11.20 UNCOMPLICATED OPIOID DEPENDENCE (HCC): ICD-10-CM

## 2021-03-19 DIAGNOSIS — M54.50 CHRONIC BILATERAL LOW BACK PAIN WITHOUT SCIATICA: ICD-10-CM

## 2021-03-19 DIAGNOSIS — G89.4 CHRONIC PAIN SYNDROME: Primary | ICD-10-CM

## 2021-03-19 PROCEDURE — 3008F BODY MASS INDEX DOCD: CPT | Performed by: NURSE PRACTITIONER

## 2021-03-19 PROCEDURE — 99214 OFFICE O/P EST MOD 30 MIN: CPT | Performed by: NURSE PRACTITIONER

## 2021-03-19 RX ORDER — VARENICLINE TARTRATE
KIT
COMMUNITY
Start: 2021-03-02

## 2021-03-19 RX ORDER — OXYCODONE HYDROCHLORIDE 5 MG/1
TABLET ORAL
Qty: 120 TABLET | Refills: 0 | Status: SHIPPED | OUTPATIENT
Start: 2021-03-19 | End: 2021-05-14 | Stop reason: SDUPTHER

## 2021-03-19 NOTE — PATIENT INSTRUCTIONS
Please  your March prescription today  Next refill 4/17/2021  Opioid Dependence   WHAT YOU NEED TO KNOW:   What is opioid dependence? Opioids are medicines, such as morphine and codeine, used to treat pain  Dependence happens after you have used opioids regularly for a long period of time  Dependence means that your body gets used to how much medicine you take  Dependence is not the same as addiction  Addiction means that a person uses opioids to get high instead of using them to control pain  What are the signs and symptoms of opioid dependence? · You need more of the opioid to get the same amount of pain relief as you did when you first started taking it  · You have tried to use less opioid medicine but are not able to  · You have withdrawal symptoms when you take less of the opioid  What are the signs and symptoms of opioid withdrawal?  You may have the following signs and symptoms if you suddenly stop taking opioids or if you decrease the amount you normally take:  · Yawning     · Runny nose     · Nausea or vomiting     · Diarrhea     · Chills or goosebumps    · Muscle aches or cramps     · Anxiety  How is opioid dependence diagnosed? Your healthcare provider will do a physical exam  He will ask you questions about your symptoms and your use of opioids  He will also ask about your current and past use of other drugs and any family history of drug abuse  How is opioid dependence treated? You may be treated in a hospital or you may be treated as an outpatient  During detoxification (detox), healthcare providers will slowly decrease your dose of the opioid medicine you are dependent on  They may use another opioid medicine such as methadone to decrease symptoms of withdrawal  You may need to take this or another medicine for some time  Your healthcare provider will also replace the opioid with another pain medicine that is less likely to cause dependence   He may also suggest that you receive counseling and social support during treatment  What are the risks of opioid dependence? There is a risk of overdose during early treatment with methadone  You may become dependent on the medicines used to treat opioid dependence  Without treatment, you may develop other health problems or become addicted to opioids  Your risk of abusing alcohol and other drugs increases  You may also develop risky behaviors that can lead to an overdose, violence, and suicidal thoughts  When should I contact my healthcare provider? · Your speech is slurred  · You have difficulty staying awake  · You have nausea and vomiting  · You are easily upset or cry easily  · You have poor balance  · You have questions or concerns about your condition or care  When should I seek immediate care or call 911? · You feel lightheaded or faint  · You have a fast, slow, or irregular heartbeat  · You have a seizure  CARE AGREEMENT:   You have the right to help plan your care  Learn about your health condition and how it may be treated  Discuss treatment options with your caregivers to decide what care you want to receive  You always have the right to refuse treatment  The above information is an  only  It is not intended as medical advice for individual conditions or treatments  Talk to your doctor, nurse or pharmacist before following any medical regimen to see if it is safe and effective for you  © 2017 [employer] Information is for End User's use only and may not be sold, redistributed or otherwise used for commercial purposes  All illustrations and images included in CareNotes® are the copyrighted property of [employer]  or Jorge Fernandez
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
## 2021-05-14 ENCOUNTER — OFFICE VISIT (OUTPATIENT)
Dept: PAIN MEDICINE | Facility: CLINIC | Age: 41
End: 2021-05-14
Payer: COMMERCIAL

## 2021-05-14 VITALS
DIASTOLIC BLOOD PRESSURE: 118 MMHG | SYSTOLIC BLOOD PRESSURE: 166 MMHG | RESPIRATION RATE: 18 BRPM | WEIGHT: 237 LBS | HEIGHT: 71 IN | BODY MASS INDEX: 33.18 KG/M2 | HEART RATE: 103 BPM

## 2021-05-14 DIAGNOSIS — M54.50 CHRONIC BILATERAL LOW BACK PAIN WITHOUT SCIATICA: ICD-10-CM

## 2021-05-14 DIAGNOSIS — F11.20 UNCOMPLICATED OPIOID DEPENDENCE (HCC): ICD-10-CM

## 2021-05-14 DIAGNOSIS — Z79.891 LONG-TERM CURRENT USE OF OPIATE ANALGESIC: ICD-10-CM

## 2021-05-14 DIAGNOSIS — Z87.820 HISTORY OF TRAUMATIC BRAIN INJURY: ICD-10-CM

## 2021-05-14 DIAGNOSIS — G89.29 CHRONIC BILATERAL LOW BACK PAIN WITHOUT SCIATICA: ICD-10-CM

## 2021-05-14 DIAGNOSIS — G89.4 CHRONIC PAIN SYNDROME: Primary | ICD-10-CM

## 2021-05-14 PROCEDURE — 3008F BODY MASS INDEX DOCD: CPT | Performed by: NURSE PRACTITIONER

## 2021-05-14 PROCEDURE — 80305 DRUG TEST PRSMV DIR OPT OBS: CPT | Performed by: NURSE PRACTITIONER

## 2021-05-14 PROCEDURE — 99214 OFFICE O/P EST MOD 30 MIN: CPT | Performed by: NURSE PRACTITIONER

## 2021-05-14 RX ORDER — OXYCODONE HYDROCHLORIDE 5 MG/1
TABLET ORAL
Qty: 120 TABLET | Refills: 0 | Status: SHIPPED | OUTPATIENT
Start: 2021-05-14 | End: 2021-07-06 | Stop reason: SDUPTHER

## 2021-05-14 RX ORDER — OXYCODONE HYDROCHLORIDE 5 MG/1
TABLET ORAL
Qty: 120 TABLET | Refills: 0 | Status: SHIPPED | OUTPATIENT
Start: 2021-05-14 | End: 2021-05-14 | Stop reason: SDUPTHER
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
## 2021-05-18 LAB

## 2021-07-06 ENCOUNTER — TELEPHONE (OUTPATIENT)
Dept: PAIN MEDICINE | Facility: CLINIC | Age: 41
End: 2021-07-06

## 2021-07-06 DIAGNOSIS — G89.4 CHRONIC PAIN SYNDROME: ICD-10-CM

## 2021-07-06 RX ORDER — OXYCODONE HYDROCHLORIDE 5 MG/1
TABLET ORAL
Qty: 5 TABLET | Refills: 0 | Status: SHIPPED | OUTPATIENT
Start: 2021-07-06 | End: 2021-07-12 | Stop reason: SDUPTHER

## 2021-07-06 NOTE — TELEPHONE ENCOUNTER
Pts mom Og Paul  # [phone]    Caller says office is rescheduling pts appt with [facility] on 7/9  Pt will be running out of oxycodone needs to have a refill

## 2021-07-12 ENCOUNTER — OFFICE VISIT (OUTPATIENT)
Dept: PAIN MEDICINE | Facility: CLINIC | Age: 41
End: 2021-07-12
Payer: COMMERCIAL

## 2021-07-12 VITALS
HEART RATE: 100 BPM | WEIGHT: 235 LBS | RESPIRATION RATE: 18 BRPM | DIASTOLIC BLOOD PRESSURE: 99 MMHG | BODY MASS INDEX: 32.9 KG/M2 | SYSTOLIC BLOOD PRESSURE: 158 MMHG | HEIGHT: 71 IN

## 2021-07-12 DIAGNOSIS — M54.50 CHRONIC BILATERAL LOW BACK PAIN WITHOUT SCIATICA: ICD-10-CM

## 2021-07-12 DIAGNOSIS — Z79.891 LONG-TERM CURRENT USE OF OPIATE ANALGESIC: ICD-10-CM

## 2021-07-12 DIAGNOSIS — G89.29 CHRONIC BILATERAL LOW BACK PAIN WITHOUT SCIATICA: ICD-10-CM

## 2021-07-12 DIAGNOSIS — G89.4 CHRONIC PAIN SYNDROME: Primary | ICD-10-CM

## 2021-07-12 DIAGNOSIS — F11.20 UNCOMPLICATED OPIOID DEPENDENCE (HCC): ICD-10-CM

## 2021-07-12 DIAGNOSIS — Z98.890 S/P CRANIOTOMY: ICD-10-CM

## 2021-07-12 PROCEDURE — 99214 OFFICE O/P EST MOD 30 MIN: CPT | Performed by: NURSE PRACTITIONER

## 2021-07-12 PROCEDURE — 3008F BODY MASS INDEX DOCD: CPT | Performed by: NURSE PRACTITIONER

## 2021-07-12 RX ORDER — OXYCODONE HYDROCHLORIDE 5 MG/1
TABLET ORAL
Qty: 120 TABLET | Refills: 0 | Status: SHIPPED | OUTPATIENT
Start: 2021-07-12 | End: 2021-07-12 | Stop reason: SDUPTHER

## 2021-07-12 RX ORDER — OXYCODONE HYDROCHLORIDE 5 MG/1
TABLET ORAL
Qty: 120 TABLET | Refills: 0 | Status: SHIPPED | OUTPATIENT
Start: 2021-07-12 | End: 2021-09-02 | Stop reason: SDUPTHER
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
## 2021-07-12 NOTE — PATIENT INSTRUCTIONS
You may  your prescriptions today and on 8/10/2021    Opioid Dependence   WHAT YOU NEED TO KNOW:   Opioids are medicines, such as morphine and codeine, used to treat pain  Dependence happens after you have used opioids regularly for a long period of time  Dependence means that your body gets used to how much medicine you take  Dependence is not the same as addiction  Addiction means that a person uses opioids to get high instead of using them to control pain  DISCHARGE INSTRUCTIONS:   Follow up with your healthcare provider or pain specialist as directed: You may need to return for other tests  You may also be referred to a specialty clinic to receive maintenance therapy medicine on a regular basis  Write down your questions so you remember to ask them during your visits  Psychological counseling and support: Your healthcare provider may recommend that you receive psychological counseling as part of your treatment plan  A specially trained healthcare provider will speak with you about your opioid dependence  They will help you find ways to become less dependent on opioids  They may also help you find resources for any daily living needs you have, such as housing or employment  Contact your healthcare provider if:   · Your speech is slurred  · You have difficulty staying awake  · You have nausea and vomiting  · You are easily upset or cry easily  · You have poor balance  · You have questions or concerns about your condition or care  Return to the emergency department if:   · You feel lightheaded or faint  · You have a fast, slow, or irregular heartbeat  · You have a seizure  © 2017 [employer] Information is for End User's use only and may not be sold, redistributed or otherwise used for commercial purposes  All illustrations and images included in CareNotes® are the copyrighted property of [employer]  or [employer] Fernandez    The above information is an  only  It is not intended as medical advice for individual conditions or treatments  Talk to your doctor, nurse or pharmacist before following any medical regimen to see if it is safe and effective for you

## 2021-08-25 ENCOUNTER — TELEPHONE (OUTPATIENT)
Dept: PAIN MEDICINE | Facility: CLINIC | Age: 41
End: 2021-08-25

## 2021-08-25 DIAGNOSIS — G89.4 CHRONIC PAIN SYNDROME: ICD-10-CM

## 2021-08-25 NOTE — TELEPHONE ENCOUNTER
Delfino Hernandez ( Mom) Pt contacted Call Center requested refill of their medication  Pt will run out by the 8th     Medication Name:oxyCODONE (ROXICODONE        Dosage of Med: 5 mg      Frequency of Med:Take 1 tab by mouth every 6 hours as needed for pain      Remaining Medication:5       Pharmacy and Location:  [address], [address], [address] [place] [address]   Phone:  [phone]        Pt  Preferred Callback Phone Number: [phone]       Thank you

## 2021-08-25 NOTE — TELEPHONE ENCOUNTER
S/W Mom Kiera Darby,  States pt will be out as of Sept 8th pm and will have no pills for hour drive to appt  Can 4 pills be sent to get pt to appt?  Aware WW out til next week  Please advise

## 2021-09-02 RX ORDER — OXYCODONE HYDROCHLORIDE 5 MG/1
TABLET ORAL
Qty: 4 TABLET | Refills: 0 | Status: SHIPPED | OUTPATIENT
Start: 2021-09-02 | End: 2021-09-10 | Stop reason: SDUPTHER

## 2021-09-09 ENCOUNTER — TELEPHONE (OUTPATIENT)
Dept: PAIN MEDICINE | Facility: CLINIC | Age: 41
End: 2021-09-09

## 2021-09-09 DIAGNOSIS — G89.4 CHRONIC PAIN SYNDROME: ICD-10-CM

## 2021-09-09 NOTE — TELEPHONE ENCOUNTER
Joe Delong out today called to reschl appt  patients mother reschled appt for Monday 9/13 but is concerned  He is out of meds today and would like them called in today until they get to office on Monday for appt  Please call pt mom Maryjo Mayes @ [phone]

## 2021-09-09 NOTE — TELEPHONE ENCOUNTER
The patient was not due to run out of medication with the original prescription until tomorrow  He was then given another short prescription to get him through to his appointment today  I do understand that his appointment was canceled and rescheduled  However, I am not comfortable continuing to prescribe opioids to a patient that I never treated and who seems to be using more medication than is being prescribed  Can you please look into this further for me and find out why he keeps running out of his medications    Thank you

## 2021-09-09 NOTE — TELEPHONE ENCOUNTER
Pharmacy received phone call from patient states we are going to send a script for emergency today she was asking if anything was sent over today, I told no I don't see anything sent to pharmacy today       Thank you

## 2021-09-09 NOTE — TELEPHONE ENCOUNTER
[facility] pt, can a short Rx of Oxycodone be sent to get pt through today and tomorrow until [facility] can address? Thank you

## 2021-09-09 NOTE — TELEPHONE ENCOUNTER
Patient's mom Cheryl Severe called checking if patient's medication was called into his pharmacy yet  I relayed previous message & stated to check pharmacy first & if not, call us back

## 2021-09-09 NOTE — TELEPHONE ENCOUNTER
pts mom gali had to hang up during last call- she would like a call back at [phone]  She said she cannot bring him on 9/13 so she rescheduled to 9/16   Wont have enough pills to last

## 2021-09-09 NOTE — TELEPHONE ENCOUNTER
Patient called back stating that he feels as if he is being treated that he did something wrong and that the prescription was not filled on the 12th like "everyone was insisting" Patient stated he got the prescription on the 10th and this is why he is out of medication  I apologized to the patient and told him id pass along the message for him and that if he was in a lot of pain to please seek emergency care--per messages above

## 2021-09-09 NOTE — TELEPHONE ENCOUNTER
[facility] please address ASAP  S/W pt again  Pt talked over me as I attempted to explain entry below regarding his medication schedule  Advised WW would look into this tomorrow and that if he had that much pain, he should go to the ED

## 2021-09-09 NOTE — TELEPHONE ENCOUNTER
[facility], please review below and advise:    S/W Mom Cheryl Severe, Legal Guardian  Advised pt picked up Rx for 30 days on 7/12 (Last OV, and in notes stating the same) which should have gotten him through the end of the day on 8/10/21  Then 8/11/21 for 30 days through 9/9/21, plus he was given 4 extra tabs on 9/8/21  Advised pt should not be out of medication and On Call Provider not comfortable with Rx'ing and more pills until [facility] gets back tomorrow  Pt is over by 8 pills according to this schedule  Advised Vana Severe if pt in that much pain, he can go to ED

## 2021-09-10 RX ORDER — OXYCODONE HYDROCHLORIDE 5 MG/1
TABLET ORAL
Qty: 24 TABLET | Refills: 0 | Status: SHIPPED | OUTPATIENT
Start: 2021-09-10 | End: 2021-09-16 | Stop reason: SDUPTHER

## 2021-09-10 NOTE — TELEPHONE ENCOUNTER
Jose Negron called in regard to the medication request   Pt is in a lot of pain and needs his medication  Please be advised thank you    Pt can be reached @ [phone]

## 2021-09-10 NOTE — TELEPHONE ENCOUNTER
[facility] please see below and advise when pt should be due for med refill and if short term script can be sent to get pt to 9/16 rescheduled appt

## 2021-09-16 ENCOUNTER — OFFICE VISIT (OUTPATIENT)
Dept: PAIN MEDICINE | Facility: CLINIC | Age: 41
End: 2021-09-16
Payer: COMMERCIAL

## 2021-09-16 VITALS
BODY MASS INDEX: 33.96 KG/M2 | WEIGHT: 242.6 LBS | RESPIRATION RATE: 18 BRPM | SYSTOLIC BLOOD PRESSURE: 150 MMHG | DIASTOLIC BLOOD PRESSURE: 90 MMHG | HEART RATE: 87 BPM | HEIGHT: 71 IN

## 2021-09-16 DIAGNOSIS — G89.29 CHRONIC BILATERAL LOW BACK PAIN WITHOUT SCIATICA: ICD-10-CM

## 2021-09-16 DIAGNOSIS — G89.4 CHRONIC PAIN SYNDROME: Primary | ICD-10-CM

## 2021-09-16 DIAGNOSIS — Z98.890 S/P CRANIOTOMY: ICD-10-CM

## 2021-09-16 DIAGNOSIS — M54.50 CHRONIC BILATERAL LOW BACK PAIN WITHOUT SCIATICA: ICD-10-CM

## 2021-09-16 DIAGNOSIS — Z79.891 LONG-TERM CURRENT USE OF OPIATE ANALGESIC: ICD-10-CM

## 2021-09-16 DIAGNOSIS — F11.20 UNCOMPLICATED OPIOID DEPENDENCE (HCC): ICD-10-CM

## 2021-09-16 PROCEDURE — 3008F BODY MASS INDEX DOCD: CPT | Performed by: NURSE PRACTITIONER

## 2021-09-16 PROCEDURE — 4004F PT TOBACCO SCREEN RCVD TLK: CPT | Performed by: NURSE PRACTITIONER

## 2021-09-16 PROCEDURE — 99214 OFFICE O/P EST MOD 30 MIN: CPT | Performed by: NURSE PRACTITIONER

## 2021-09-16 PROCEDURE — 80305 DRUG TEST PRSMV DIR OPT OBS: CPT | Performed by: NURSE PRACTITIONER

## 2021-09-16 RX ORDER — OXYCODONE HYDROCHLORIDE 5 MG/1
TABLET ORAL
Qty: 120 TABLET | Refills: 0 | Status: SHIPPED | OUTPATIENT
Start: 2021-09-16 | End: 2021-11-03 | Stop reason: SDUPTHER

## 2021-09-16 RX ORDER — OXYCODONE HYDROCHLORIDE 5 MG/1
TABLET ORAL
Qty: 120 TABLET | Refills: 0 | Status: SHIPPED | OUTPATIENT
Start: 2021-09-16 | End: 2021-09-16 | Stop reason: SDUPTHER

## 2021-09-16 NOTE — PATIENT INSTRUCTIONS
You may  your prescriptions today and on 10/14/2021    Opioid Dependence   WHAT YOU NEED TO KNOW:   What is opioid dependence? Opioids are medicines, such as morphine and codeine, used to treat pain  Dependence happens after you have used opioids regularly for a long period of time  Dependence means that your body gets used to how much medicine you take  Dependence is not the same as addiction  Addiction means that a person uses opioids to get high instead of using them to control pain  What are the signs and symptoms of opioid dependence? · You need more of the opioid to get the same amount of pain relief as you did when you first started taking it  · You have tried to use less opioid medicine but are not able to  · You have withdrawal symptoms when you take less of the opioid  What are the signs and symptoms of opioid withdrawal?  You may have the following signs and symptoms if you suddenly stop taking opioids or if you decrease the amount you normally take:  · Yawning     · Runny nose     · Nausea or vomiting     · Diarrhea     · Chills or goosebumps    · Muscle aches or cramps     · Anxiety  How is opioid dependence diagnosed? Your healthcare provider will do a physical exam  He will ask you questions about your symptoms and your use of opioids  He will also ask about your current and past use of other drugs and any family history of drug abuse  How is opioid dependence treated? You may be treated in a hospital or you may be treated as an outpatient  During detoxification (detox), healthcare providers will slowly decrease your dose of the opioid medicine you are dependent on  They may use another opioid medicine such as methadone to decrease symptoms of withdrawal  You may need to take this or another medicine for some time  Your healthcare provider will also replace the opioid with another pain medicine that is less likely to cause dependence   He may also suggest that you receive counseling and social support during treatment  What are the risks of opioid dependence? There is a risk of overdose during early treatment with methadone  You may become dependent on the medicines used to treat opioid dependence  Without treatment, you may develop other health problems or become addicted to opioids  Your risk of abusing alcohol and other drugs increases  You may also develop risky behaviors that can lead to an overdose, violence, and suicidal thoughts  When should I contact my healthcare provider? · Your speech is slurred  · You have difficulty staying awake  · You have nausea and vomiting  · You are easily upset or cry easily  · You have poor balance  · You have questions or concerns about your condition or care  When should I seek immediate care or call 911? · You feel lightheaded or faint  · You have a fast, slow, or irregular heartbeat  · You have a seizure  CARE AGREEMENT:   You have the right to help plan your care  Learn about your health condition and how it may be treated  Discuss treatment options with your caregivers to decide what care you want to receive  You always have the right to refuse treatment  The above information is an  only  It is not intended as medical advice for individual conditions or treatments  Talk to your doctor, nurse or pharmacist before following any medical regimen to see if it is safe and effective for you  © 2017 [employer] Information is for End User's use only and may not be sold, redistributed or otherwise used for commercial purposes  All illustrations and images included in CareNotes® are the copyrighted property of [employer]  or Jorge Fernandez
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
## 2021-09-18 LAB
6MAM UR QL CFM: NEGATIVE NG/ML
7AMINOCLONAZEPAM UR QL CFM: NEGATIVE NG/ML
A-OH ALPRAZ UR QL CFM: NEGATIVE NG/ML
AMPHET UR QL CFM: NEGATIVE NG/ML
AMPHET UR QL CFM: NEGATIVE NG/ML
BUPRENORPHINE UR QL CFM: NEGATIVE NG/ML
BUTALBITAL UR QL CFM: NEGATIVE NG/ML
BZE UR QL CFM: NEGATIVE NG/ML
CODEINE UR QL CFM: NEGATIVE NG/ML
DESIPRAMINE UR QL CFM: NEGATIVE NG/ML
EDDP UR QL CFM: NEGATIVE NG/ML
ETHYL GLUCURONIDE UR QL CFM: NEGATIVE NG/ML
ETHYL SULFATE UR QL SCN: NEGATIVE NG/ML
FENTANYL UR QL CFM: NEGATIVE NG/ML
GLIADIN IGG SER IA-ACNC: NEGATIVE NG/ML
GLUCOSE 30M P 50 G LAC PO SERPL-MCNC: NEGATIVE NG/ML
HYDROCODONE UR QL CFM: NEGATIVE NG/ML
HYDROCODONE UR QL CFM: NEGATIVE NG/ML
HYDROMORPHONE UR QL CFM: NEGATIVE NG/ML
LORAZEPAM UR QL CFM: NEGATIVE NG/ML
MDMA UR QL CFM: NEGATIVE NG/ML
MEPERIDINE UR QL CFM: NEGATIVE NG/ML
MEPHEDRONE UR QL CFM: NEGATIVE NG/ML
METHADONE UR QL CFM: NEGATIVE NG/ML
METHAMPHET UR QL CFM: NEGATIVE NG/ML
MORPHINE UR QL CFM: NEGATIVE NG/ML
MORPHINE UR QL CFM: NEGATIVE NG/ML
NORBUPRENORPHINE UR QL CFM: NEGATIVE NG/ML
NORDIAZEPAM UR QL CFM: NEGATIVE NG/ML
NORFENTANYL UR QL CFM: NEGATIVE NG/ML
NORHYDROCODONE UR QL CFM: NEGATIVE NG/ML
NORHYDROCODONE UR QL CFM: NEGATIVE NG/ML
NORMEPERIDINE UR QL CFM: NEGATIVE NG/ML
NOROXYCODONE UR QL CFM: NORMAL NG/ML
OLANZAPINE QUANTIFICATION: NEGATIVE NG/ML
OPC-3373 QUANTIFICATION: NEGATIVE
OXAZEPAM UR QL CFM: NEGATIVE NG/ML
OXYCODONE UR QL CFM: NORMAL NG/ML
OXYMORPHONE UR QL CFM: NORMAL NG/ML
OXYMORPHONE UR QL CFM: NORMAL NG/ML
PHENOBARB UR QL CFM: NEGATIVE NG/ML
SECOBARBITAL UR QL CFM: NEGATIVE NG/ML
SL AMB 3-METHYL-FENTANYL QUANTIFICATION: NORMAL NG/ML
SL AMB 4-ANPP QUANTIFICATION: NORMAL NG/ML
SL AMB 4-FIBF QUANTIFICATION: NORMAL NG/ML
SL AMB 5F-ADB-M7 METABOLITE QUANTIFICATION: NEGATIVE NG/ML
SL AMB 7-OH-MITRAGYNINE (KRATOM ALKALOID) QUANTIFICATION: NEGATIVE NG/ML
SL AMB AB-FUBINACA-M3 METABOLITE QUANTIFICATION: NEGATIVE NG/ML
SL AMB ACETYL FENTANYL QUANTIFICATION: NORMAL NG/ML
SL AMB ACETYL NORFENTANYL QUANTIFICATION: NORMAL NG/ML
SL AMB ACRYL FENTANYL QUANTIFICATION: NORMAL NG/ML
SL AMB BATH SALTS: NEGATIVE NG/ML
SL AMB BUTRYL FENTANYL QUANTIFICATION: NORMAL NG/ML
SL AMB CARFENTANIL QUANTIFICATION: NORMAL NG/ML
SL AMB CLOZAPINE QUANTIFICATION: NEGATIVE NG/ML
SL AMB CTHC (MARIJUANA METABOLITE) QUANTIFICATION: NEGATIVE NG/ML
SL AMB CYCLOPROPYL FENTANYL QUANTIFICATION: NORMAL NG/ML
SL AMB DEXTROMETHORPHAN QUANTIFICATION: NEGATIVE NG/ML
SL AMB DEXTRORPHAN (DEXTROMETHORPHAN METABOLITE) QUANT: NEGATIVE NG/ML
SL AMB DEXTRORPHAN (DEXTROMETHORPHAN METABOLITE) QUANT: NEGATIVE NG/ML
SL AMB FURANYL FENTANYL QUANTIFICATION: NORMAL NG/ML
SL AMB JWH018 METABOLITE QUANTIFICATION: NEGATIVE NG/ML
SL AMB JWH073 METABOLITE QUANTIFICATION: NEGATIVE NG/ML
SL AMB MDMB-FUBINACA-M1 METABOLITE QUANTIFICATION: NEGATIVE NG/ML
SL AMB METHOXYACETYL FENTANYL QUANTIFICATION: NORMAL NG/ML
SL AMB METHYLONE QUANTIFICATION: NEGATIVE NG/ML
SL AMB N-DESMETHYL U-47700 QUANTIFICATION: NORMAL NG/ML
SL AMB N-DESMETHYL-TRAMADOL QUANTIFICATION: NEGATIVE NG/ML
SL AMB N-DESMETHYLCLOZAPINE QUANTIFICATION: NEGATIVE NG/ML
SL AMB PHENTERMINE QUANTIFICATION: NEGATIVE NG/ML
SL AMB RCS4 METABOLITE QUANTIFICATION: NEGATIVE NG/ML
SL AMB U-47700 QUANTIFICATION: NORMAL NG/ML
SPECIMEN DRAWN SERPL: NEGATIVE NG/ML
TAPENTADOL UR QL CFM: NEGATIVE NG/ML
TEMAZEPAM UR QL CFM: NEGATIVE NG/ML
TEMAZEPAM UR QL CFM: NEGATIVE NG/ML
TRAMADOL UR QL CFM: NEGATIVE NG/ML
URATE/CREAT 24H UR: NEGATIVE NG/ML

## 2021-11-03 ENCOUNTER — OFFICE VISIT (OUTPATIENT)
Dept: PAIN MEDICINE | Facility: CLINIC | Age: 41
End: 2021-11-03
Payer: COMMERCIAL

## 2021-11-03 VITALS
SYSTOLIC BLOOD PRESSURE: 154 MMHG | HEART RATE: 99 BPM | HEIGHT: 71 IN | WEIGHT: 241 LBS | DIASTOLIC BLOOD PRESSURE: 96 MMHG | BODY MASS INDEX: 33.74 KG/M2 | RESPIRATION RATE: 18 BRPM

## 2021-11-03 DIAGNOSIS — Z98.890 STATUS POST CRANIECTOMY: ICD-10-CM

## 2021-11-03 DIAGNOSIS — M54.50 CHRONIC BILATERAL LOW BACK PAIN WITHOUT SCIATICA: ICD-10-CM

## 2021-11-03 DIAGNOSIS — Z79.891 LONG-TERM CURRENT USE OF OPIATE ANALGESIC: ICD-10-CM

## 2021-11-03 DIAGNOSIS — G89.29 CHRONIC BILATERAL LOW BACK PAIN WITHOUT SCIATICA: ICD-10-CM

## 2021-11-03 DIAGNOSIS — G89.4 CHRONIC PAIN SYNDROME: Primary | ICD-10-CM

## 2021-11-03 DIAGNOSIS — F11.20 UNCOMPLICATED OPIOID DEPENDENCE (HCC): ICD-10-CM

## 2021-11-03 PROCEDURE — 4004F PT TOBACCO SCREEN RCVD TLK: CPT | Performed by: STUDENT IN AN ORGANIZED HEALTH CARE EDUCATION/TRAINING PROGRAM

## 2021-11-03 PROCEDURE — 3008F BODY MASS INDEX DOCD: CPT | Performed by: STUDENT IN AN ORGANIZED HEALTH CARE EDUCATION/TRAINING PROGRAM

## 2021-11-03 PROCEDURE — 99214 OFFICE O/P EST MOD 30 MIN: CPT | Performed by: STUDENT IN AN ORGANIZED HEALTH CARE EDUCATION/TRAINING PROGRAM

## 2021-11-03 RX ORDER — OXYCODONE HYDROCHLORIDE 5 MG/1
5 TABLET ORAL EVERY 6 HOURS PRN
Qty: 120 TABLET | Refills: 0 | Status: SHIPPED | OUTPATIENT
Start: 2021-11-13 | End: 2021-12-12

## 2021-11-03 RX ORDER — OXYCODONE HYDROCHLORIDE 5 MG/1
5 TABLET ORAL EVERY 6 HOURS PRN
Qty: 120 TABLET | Refills: 0 | Status: SHIPPED | OUTPATIENT
Start: 2021-12-13 | End: 2022-01-05 | Stop reason: SDUPTHER

## 2022-01-05 ENCOUNTER — OFFICE VISIT (OUTPATIENT)
Dept: PAIN MEDICINE | Facility: CLINIC | Age: 42
End: 2022-01-05
Payer: COMMERCIAL

## 2022-01-05 VITALS
BODY MASS INDEX: 34.02 KG/M2 | WEIGHT: 243 LBS | HEIGHT: 71 IN | SYSTOLIC BLOOD PRESSURE: 152 MMHG | HEART RATE: 98 BPM | DIASTOLIC BLOOD PRESSURE: 86 MMHG

## 2022-01-05 DIAGNOSIS — Z98.890 STATUS POST CRANIECTOMY: ICD-10-CM

## 2022-01-05 DIAGNOSIS — G89.29 CHRONIC BILATERAL LOW BACK PAIN WITHOUT SCIATICA: ICD-10-CM

## 2022-01-05 DIAGNOSIS — Z79.891 LONG-TERM CURRENT USE OF OPIATE ANALGESIC: ICD-10-CM

## 2022-01-05 DIAGNOSIS — M54.50 CHRONIC BILATERAL LOW BACK PAIN WITHOUT SCIATICA: ICD-10-CM

## 2022-01-05 DIAGNOSIS — G89.4 CHRONIC PAIN SYNDROME: Primary | ICD-10-CM

## 2022-01-05 DIAGNOSIS — F11.20 UNCOMPLICATED OPIOID DEPENDENCE (HCC): ICD-10-CM

## 2022-01-05 PROCEDURE — 99214 OFFICE O/P EST MOD 30 MIN: CPT | Performed by: STUDENT IN AN ORGANIZED HEALTH CARE EDUCATION/TRAINING PROGRAM

## 2022-01-05 PROCEDURE — 80305 DRUG TEST PRSMV DIR OPT OBS: CPT | Performed by: STUDENT IN AN ORGANIZED HEALTH CARE EDUCATION/TRAINING PROGRAM

## 2022-01-05 PROCEDURE — 3008F BODY MASS INDEX DOCD: CPT | Performed by: STUDENT IN AN ORGANIZED HEALTH CARE EDUCATION/TRAINING PROGRAM

## 2022-01-05 PROCEDURE — 4004F PT TOBACCO SCREEN RCVD TLK: CPT | Performed by: STUDENT IN AN ORGANIZED HEALTH CARE EDUCATION/TRAINING PROGRAM

## 2022-01-05 RX ORDER — OXYCODONE HYDROCHLORIDE 5 MG/1
5 TABLET ORAL EVERY 6 HOURS PRN
Qty: 120 TABLET | Refills: 0 | Status: SHIPPED | OUTPATIENT
Start: 2022-01-12 | End: 2022-02-10

## 2022-01-05 RX ORDER — OXYCODONE HYDROCHLORIDE 5 MG/1
5 TABLET ORAL EVERY 6 HOURS PRN
Qty: 120 TABLET | Refills: 0 | Status: SHIPPED | OUTPATIENT
Start: 2022-02-11 | End: 2022-03-04 | Stop reason: SDUPTHER
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
## 2022-03-04 ENCOUNTER — OFFICE VISIT (OUTPATIENT)
Dept: PAIN MEDICINE | Facility: CLINIC | Age: 42
End: 2022-03-04
Payer: COMMERCIAL

## 2022-03-04 VITALS
HEIGHT: 71 IN | SYSTOLIC BLOOD PRESSURE: 138 MMHG | WEIGHT: 253.6 LBS | HEART RATE: 94 BPM | RESPIRATION RATE: 18 BRPM | DIASTOLIC BLOOD PRESSURE: 87 MMHG | BODY MASS INDEX: 35.5 KG/M2

## 2022-03-04 DIAGNOSIS — Z98.890 STATUS POST CRANIECTOMY: ICD-10-CM

## 2022-03-04 DIAGNOSIS — M54.50 CHRONIC BILATERAL LOW BACK PAIN WITHOUT SCIATICA: ICD-10-CM

## 2022-03-04 DIAGNOSIS — G89.29 CHRONIC BILATERAL LOW BACK PAIN WITHOUT SCIATICA: ICD-10-CM

## 2022-03-04 DIAGNOSIS — F11.20 UNCOMPLICATED OPIOID DEPENDENCE (HCC): ICD-10-CM

## 2022-03-04 DIAGNOSIS — G89.4 CHRONIC PAIN SYNDROME: Primary | ICD-10-CM

## 2022-03-04 DIAGNOSIS — Z79.891 LONG-TERM CURRENT USE OF OPIATE ANALGESIC: ICD-10-CM

## 2022-03-04 PROCEDURE — 99214 OFFICE O/P EST MOD 30 MIN: CPT | Performed by: STUDENT IN AN ORGANIZED HEALTH CARE EDUCATION/TRAINING PROGRAM

## 2022-03-04 PROCEDURE — 4004F PT TOBACCO SCREEN RCVD TLK: CPT | Performed by: STUDENT IN AN ORGANIZED HEALTH CARE EDUCATION/TRAINING PROGRAM

## 2022-03-04 PROCEDURE — 3008F BODY MASS INDEX DOCD: CPT | Performed by: STUDENT IN AN ORGANIZED HEALTH CARE EDUCATION/TRAINING PROGRAM

## 2022-03-04 PROCEDURE — 80305 DRUG TEST PRSMV DIR OPT OBS: CPT | Performed by: STUDENT IN AN ORGANIZED HEALTH CARE EDUCATION/TRAINING PROGRAM

## 2022-03-04 RX ORDER — OXYCODONE HYDROCHLORIDE 5 MG/1
5 TABLET ORAL EVERY 6 HOURS PRN
Qty: 120 TABLET | Refills: 0 | Status: SHIPPED | OUTPATIENT
Start: 2022-04-12 | End: 2022-04-28 | Stop reason: SDUPTHER

## 2022-03-04 RX ORDER — OXYCODONE HYDROCHLORIDE 5 MG/1
5 TABLET ORAL EVERY 6 HOURS PRN
Qty: 120 TABLET | Refills: 0 | Status: SHIPPED | OUTPATIENT
Start: 2022-03-13 | End: 2022-04-11

## 2022-03-08 LAB
6MAM UR QL CFM: NEGATIVE NG/ML
7AMINOCLONAZEPAM UR QL CFM: NEGATIVE NG/ML
A-OH ALPRAZ UR QL CFM: NEGATIVE NG/ML
AMPHET UR QL CFM: NEGATIVE NG/ML
AMPHET UR QL CFM: NEGATIVE NG/ML
BUPRENORPHINE UR QL CFM: NEGATIVE NG/ML
BUTALBITAL UR QL CFM: NEGATIVE NG/ML
BZE UR QL CFM: NEGATIVE NG/ML
CODEINE UR QL CFM: NEGATIVE NG/ML
DESIPRAMINE UR QL CFM: NEGATIVE NG/ML
EDDP UR QL CFM: NEGATIVE NG/ML
ETHYL GLUCURONIDE UR QL CFM: NEGATIVE NG/ML
ETHYL SULFATE UR QL SCN: NEGATIVE NG/ML
EUTYLONE UR QL: NEGATIVE NG/ML
FENTANYL UR QL CFM: NEGATIVE NG/ML
GLIADIN IGG SER IA-ACNC: NEGATIVE NG/ML
GLUCOSE 30M P 50 G LAC PO SERPL-MCNC: NEGATIVE NG/ML
HYDROCODONE UR QL CFM: NEGATIVE NG/ML
HYDROCODONE UR QL CFM: NEGATIVE NG/ML
HYDROMORPHONE UR QL CFM: NEGATIVE NG/ML
LORAZEPAM UR QL CFM: NEGATIVE NG/ML
MDMA UR QL CFM: NEGATIVE NG/ML
MEPERIDINE UR QL CFM: NEGATIVE NG/ML
METHADONE UR QL CFM: NEGATIVE NG/ML
METHAMPHET UR QL CFM: NEGATIVE NG/ML
MORPHINE UR QL CFM: NEGATIVE NG/ML
MORPHINE UR QL CFM: NEGATIVE NG/ML
NORBUPRENORPHINE UR QL CFM: NEGATIVE NG/ML
NORDIAZEPAM UR QL CFM: NEGATIVE NG/ML
NORFENTANYL UR QL CFM: NEGATIVE NG/ML
NORHYDROCODONE UR QL CFM: NEGATIVE NG/ML
NORHYDROCODONE UR QL CFM: NEGATIVE NG/ML
NORMEPERIDINE UR QL CFM: NEGATIVE NG/ML
NOROXYCODONE UR QL CFM: NORMAL NG/ML
OLANZAPINE QUANTIFICATION: NEGATIVE NG/ML
OPC-3373 QUANTIFICATION: NEGATIVE
OXAZEPAM UR QL CFM: NEGATIVE NG/ML
OXYCODONE UR QL CFM: NORMAL NG/ML
OXYMORPHONE UR QL CFM: NORMAL NG/ML
OXYMORPHONE UR QL CFM: NORMAL NG/ML
PHENOBARB UR QL CFM: NEGATIVE NG/ML
RESULT ALL_PRESCRIBED MEDS AND SPECIAL INSTRUCTIONS: NORMAL
SECOBARBITAL UR QL CFM: NEGATIVE NG/ML
SL AMB 3-METHYL-FENTANYL QUANTIFICATION: NORMAL NG/ML
SL AMB 4-ANPP QUANTIFICATION: NORMAL NG/ML
SL AMB 4-FIBF QUANTIFICATION: NORMAL NG/ML
SL AMB 5F-ADB-M7 METABOLITE QUANTIFICATION: NEGATIVE NG/ML
SL AMB 7-OH-MITRAGYNINE (KRATOM ALKALOID) QUANTIFICATION: NEGATIVE NG/ML
SL AMB AB-FUBINACA-M3 METABOLITE QUANTIFICATION: NEGATIVE NG/ML
SL AMB ACETYL FENTANYL QUANTIFICATION: NORMAL NG/ML
SL AMB ACETYL NORFENTANYL QUANTIFICATION: NORMAL NG/ML
SL AMB ACRYL FENTANYL QUANTIFICATION: NORMAL NG/ML
SL AMB BUTRYL FENTANYL QUANTIFICATION: NORMAL NG/ML
SL AMB CARFENTANIL QUANTIFICATION: NORMAL NG/ML
SL AMB CLOZAPINE QUANTIFICATION: NEGATIVE NG/ML
SL AMB CTHC (MARIJUANA METABOLITE) QUANTIFICATION: NEGATIVE NG/ML
SL AMB CYCLOPROPYL FENTANYL QUANTIFICATION: NORMAL NG/ML
SL AMB DEXTROMETHORPHAN QUANTIFICATION: NEGATIVE NG/ML
SL AMB DEXTRORPHAN (DEXTROMETHORPHAN METABOLITE) QUANT: NEGATIVE NG/ML
SL AMB DEXTRORPHAN (DEXTROMETHORPHAN METABOLITE) QUANT: NEGATIVE NG/ML
SL AMB FURANYL FENTANYL QUANTIFICATION: NORMAL NG/ML
SL AMB JWH018 METABOLITE QUANTIFICATION: NEGATIVE NG/ML
SL AMB JWH073 METABOLITE QUANTIFICATION: NEGATIVE NG/ML
SL AMB MDMB-FUBINACA-M1 METABOLITE QUANTIFICATION: NEGATIVE NG/ML
SL AMB METHOXYACETYL FENTANYL QUANTIFICATION: NORMAL NG/ML
SL AMB METHYLONE QUANTIFICATION: NEGATIVE NG/ML
SL AMB N-DESMETHYL U-47700 QUANTIFICATION: NORMAL NG/ML
SL AMB N-DESMETHYL-TRAMADOL QUANTIFICATION: NEGATIVE NG/ML
SL AMB N-DESMETHYLCLOZAPINE QUANTIFICATION: NEGATIVE NG/ML
SL AMB PHENTERMINE QUANTIFICATION: NEGATIVE NG/ML
SL AMB RCS4 METABOLITE QUANTIFICATION: NEGATIVE NG/ML
SL AMB U-47700 QUANTIFICATION: NORMAL NG/ML
SPECIMEN DRAWN SERPL: NEGATIVE NG/ML
TAPENTADOL UR QL CFM: NEGATIVE NG/ML
TEMAZEPAM UR QL CFM: NEGATIVE NG/ML
TEMAZEPAM UR QL CFM: NEGATIVE NG/ML
TRAMADOL UR QL CFM: NEGATIVE NG/ML
URATE/CREAT 24H UR: NEGATIVE NG/ML
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
## 2022-04-28 ENCOUNTER — OFFICE VISIT (OUTPATIENT)
Dept: PAIN MEDICINE | Facility: CLINIC | Age: 42
End: 2022-04-28
Payer: COMMERCIAL

## 2022-04-28 VITALS
SYSTOLIC BLOOD PRESSURE: 171 MMHG | HEIGHT: 71 IN | WEIGHT: 250 LBS | HEART RATE: 108 BPM | BODY MASS INDEX: 35 KG/M2 | DIASTOLIC BLOOD PRESSURE: 104 MMHG

## 2022-04-28 DIAGNOSIS — F11.20 UNCOMPLICATED OPIOID DEPENDENCE (HCC): ICD-10-CM

## 2022-04-28 DIAGNOSIS — M54.50 CHRONIC BILATERAL LOW BACK PAIN WITHOUT SCIATICA: ICD-10-CM

## 2022-04-28 DIAGNOSIS — G89.29 CHRONIC BILATERAL LOW BACK PAIN WITHOUT SCIATICA: ICD-10-CM

## 2022-04-28 DIAGNOSIS — G89.4 CHRONIC PAIN SYNDROME: Primary | ICD-10-CM

## 2022-04-28 DIAGNOSIS — Z87.820 HISTORY OF TRAUMATIC BRAIN INJURY: ICD-10-CM

## 2022-04-28 DIAGNOSIS — Z79.891 LONG-TERM CURRENT USE OF OPIATE ANALGESIC: ICD-10-CM

## 2022-04-28 DIAGNOSIS — Z98.890 S/P CRANIOTOMY: ICD-10-CM

## 2022-04-28 PROCEDURE — 3008F BODY MASS INDEX DOCD: CPT

## 2022-04-28 PROCEDURE — 4004F PT TOBACCO SCREEN RCVD TLK: CPT

## 2022-04-28 PROCEDURE — 99214 OFFICE O/P EST MOD 30 MIN: CPT

## 2022-04-28 RX ORDER — FENOFIBRATE 145 MG/1
145 TABLET, COATED ORAL DAILY
COMMUNITY
Start: 2022-04-21

## 2022-04-28 RX ORDER — OXYCODONE HYDROCHLORIDE 5 MG/1
TABLET ORAL
Qty: 120 TABLET | Refills: 0 | Status: SHIPPED | OUTPATIENT
Start: 2022-06-11 | End: 2022-04-28 | Stop reason: CLARIF

## 2022-04-28 RX ORDER — OXYCODONE HYDROCHLORIDE 5 MG/1
TABLET ORAL
Qty: 120 TABLET | Refills: 0 | Status: SHIPPED | OUTPATIENT
Start: 2022-05-12 | End: 2022-06-23 | Stop reason: SDUPTHER

## 2022-04-28 NOTE — PATIENT INSTRUCTIONS
Opioid Safety   AMBULATORY CARE:   Opioid safety  includes the correct use, storage, and disposal of opioids  Examples of opioid medicines to treat pain include oxycodone, morphine, fentanyl, and codeine  Call your local emergency number (911 in the [address] [facility] [address]), or have someone else call if:   · You have a seizure  · You cannot be woken  · You have trouble staying awake and your breathing is slow or shallow  · Your speech is slurred, or you are confused  · You are dizzy or stumble when you walk  Call your doctor, or have someone close to you call if:   · You are extremely drowsy, or you have trouble staying awake or speaking  · You have pale or clammy skin  · You have blue fingernails or lips  · Your heartbeat is slower than normal     · You cannot stop vomiting  · You have questions or concerns about your condition or care  Use opioids safely:   · Take prescribed opioids exactly as directed  Opioids come with directions based on the kind of opioid and how it is given  Do not take more than the recommended amount, or for longer than needed  · Do not give opioids to others or take opioids that belong to someone else  Misuse of opioids can lead to an addiction or overdose  · Do not mix opioids with other medicines or alcohol  The combination can cause an overdose, or lead to a coma  · Do not drive or operate heavy machinery after you take the opioid  Your provider or pharmacist can tell you how long to wait after a dose before you do these activities  · Talk to your healthcare provider if you have any side effects  He or she can help you prevent or relieve side effects  Side effects include nausea, sleepiness, itching, and trouble thinking clearly  Manage constipation:  Constipation is the most common side effect of opioid medicine  Constipation is when you have hard, dry bowel movements, or you go longer than usual between bowel movements   Tell your healthcare provider about all changes in your bowel movements while you are taking opioids  He or she may recommend laxative medicine to help you have a bowel movement  He or she may also change the kind of opioid you are taking, or change when you take it  The following are more ways you can prevent or relieve constipation:  · Drink liquids as directed  You may need to drink extra liquids to help soften and move your bowels  Ask how much liquid to drink each day and which liquids are best for you  · Eat high-fiber foods  This may help decrease constipation by adding bulk to your bowel movements  High-fiber foods include fruits, vegetables, whole-grain breads and cereals, and beans  Your healthcare provider or dietitian can help you create a high-fiber meal plan  Your provider may also recommend a fiber supplement if you cannot get enough fiber from food  · Exercise regularly  Regular physical activity can help stimulate your intestines  Walking is a good exercise to prevent or relieve constipation  Ask which exercises are best for you  · Schedule a time each day to have a bowel movement  This may help train your body to have regular bowel movements  Bend forward while you are on the toilet to help move the bowel movement out  Sit on the toilet for at least 10 minutes, even if you do not have a bowel movement  Store opioids safely:   · Store opioids where others cannot easily get them  Keep them in a locked cabinet or secure area  Do not  keep them in a purse or other bag you carry with you  A person may be looking for something else and find the opioids  · Make sure opioids are stored out of the reach of children  A child can easily overdose on opioids  Opioids may look like candy to a small child  The best way to dispose of opioids: The laws vary by country and area   In the [place], the best way is to return the opioids through a take-back program  This program is offered by the [employer] ([facility])  The following are options for using the program:  · Take the opioids to a DANIEL collection site  The site is often a law enforcement center  Call your local law enforcement center for scheduled take-back days in your area  You will be given information on where to go if the collection site is in a different location  · Take the opioids to an approved pharmacy or hospital   A pharmacy or hospital may be set up as a collection site  You will need to ask if it is a DNAIEL collection site if you were not directed there  A pharmacy or doctor's office may not be able to take back opioids unless it is a DANIEL site  · Use a mail-back system  This means you are given containers to put the opioids into  You will then mail them in the containers  · Use a take-back drop box  This is a place to leave the opioids at any time  People and animals will not be able to get into the box  Your local law enforcement agency can tell you where to find a drop box in your area  Other safe ways to dispose of opioids: The medicine may come with disposal instructions  The instructions may vary depending on the brand of medicine you are using  Instructions may come in a Medication Guide, but not every medicine has one  You may instead get instructions from your pharmacy or doctor  Follow instructions carefully  The following are general guidelines to follow:  · Find out if you can flush the opioid  Some opioids can be flushed down the toilet or poured into the sink  You will need to contact authorities in your area to see if this is an option for you  The FDA also offers a list of medicines that are safe to flush down the toilet  You can check the list if you cannot get the information for your local area  · Ask your waste management company about rules for putting opioids in the trash  The company will be able to give you specific directions   Scratch out personal information on the original medicine label so it cannot be read  Then put it in the trash  Do not label the trash or put any information on it about the opioids  It should look like regular household trash so no one is tempted to look for the opioids  Keep the trash out of the reach of children and animals  Always make sure trash is secure  · Talk to officials if you live in a facility  If you live in a nursing home or assisted living center, talk to an official  The person will know the rules for your area  Other ways to manage pain:   · Ask your healthcare provider about non-opioid medicines to control pain  Nonprescription medicines include NSAIDs (such as ibuprofen) and acetaminophen  Prescription medicines include muscle relaxers, antidepressants, and steroids  · Pain may be managed without any medicines  Some ways to relieve pain include massage, aromatherapy, or meditation  Physical or occupational therapy may also help  For more information:   · Drug Enforcement Administration  [facility] [address]  Phone: 3- 539 - [phone]  Web Address: [facility]/drug_disposal/    · [address]  and Drug Administration  [facility]  [place] , [address] [place] [address]  Phone: 8- 540 - [phone]  Web Address: http://[employer]/    Follow up with your doctor or pain specialist as directed: You may need to have your dose adjusted  Your doctor or pain specialist can also help you find ways to manage pain without opioids  Write down your questions so you remember to ask them during your visits  © Copyright [employer] 2022 Information is for End User's use only and may not be sold, redistributed or otherwise used for commercial purposes  All illustrations and images included in CareNotes® are the copyrighted property of A D A M , Inc  or Sharifa Larios  The above information is an  only  It is not intended as medical advice for individual conditions or treatments   Talk to your doctor, nurse or pharmacist before following any medical regimen to see if it is safe and effective for you

## 2022-06-11 NOTE — PATIENT INSTRUCTIONS
Pt ambulated to restroom and back to ER 1 with assistance from granddaughter. Gait steady.    You may  your prescriptions on 5/15/2021 and 6/13/2021    Opioid Dependence   WHAT YOU NEED TO KNOW:   What is opioid dependence? Opioids are medicines, such as morphine and codeine, used to treat pain  Dependence happens after you have used opioids regularly for a long period of time  Dependence means that your body gets used to how much medicine you take  Dependence is not the same as addiction  Addiction means that a person uses opioids to get high instead of using them to control pain  What are the signs and symptoms of opioid dependence? · You need more of the opioid to get the same amount of pain relief as you did when you first started taking it  · You have tried to use less opioid medicine but are not able to  · You have withdrawal symptoms when you take less of the opioid  What are the signs and symptoms of opioid withdrawal?  You may have the following signs and symptoms if you suddenly stop taking opioids or if you decrease the amount you normally take:  · Yawning     · Runny nose     · Nausea or vomiting     · Diarrhea     · Chills or goosebumps    · Muscle aches or cramps     · Anxiety  How is opioid dependence diagnosed? Your healthcare provider will do a physical exam  He will ask you questions about your symptoms and your use of opioids  He will also ask about your current and past use of other drugs and any family history of drug abuse  How is opioid dependence treated? You may be treated in a hospital or you may be treated as an outpatient  During detoxification (detox), healthcare providers will slowly decrease your dose of the opioid medicine you are dependent on  They may use another opioid medicine such as methadone to decrease symptoms of withdrawal  You may need to take this or another medicine for some time  Your healthcare provider will also replace the opioid with another pain medicine that is less likely to cause dependence   He may also suggest that you receive counseling and social support during treatment  What are the risks of opioid dependence? There is a risk of overdose during early treatment with methadone  You may become dependent on the medicines used to treat opioid dependence  Without treatment, you may develop other health problems or become addicted to opioids  Your risk of abusing alcohol and other drugs increases  You may also develop risky behaviors that can lead to an overdose, violence, and suicidal thoughts  When should I contact my healthcare provider? · Your speech is slurred  · You have difficulty staying awake  · You have nausea and vomiting  · You are easily upset or cry easily  · You have poor balance  · You have questions or concerns about your condition or care  When should I seek immediate care or call 911? · You feel lightheaded or faint  · You have a fast, slow, or irregular heartbeat  · You have a seizure  CARE AGREEMENT:   You have the right to help plan your care  Learn about your health condition and how it may be treated  Discuss treatment options with your caregivers to decide what care you want to receive  You always have the right to refuse treatment  The above information is an  only  It is not intended as medical advice for individual conditions or treatments  Talk to your doctor, nurse or pharmacist before following any medical regimen to see if it is safe and effective for you  © 2017 [address] Information is for End User's use only and may not be sold, redistributed or otherwise used for commercial purposes  All illustrations and images included in CareNotes® are the copyrighted property of A D A M , Inc  or Jorge Fernandez

## 2022-06-23 ENCOUNTER — OFFICE VISIT (OUTPATIENT)
Dept: PAIN MEDICINE | Facility: CLINIC | Age: 42
End: 2022-06-23
Payer: COMMERCIAL

## 2022-06-23 VITALS
HEIGHT: 71 IN | HEART RATE: 102 BPM | WEIGHT: 254.2 LBS | SYSTOLIC BLOOD PRESSURE: 159 MMHG | DIASTOLIC BLOOD PRESSURE: 88 MMHG | BODY MASS INDEX: 35.59 KG/M2

## 2022-06-23 DIAGNOSIS — G89.4 CHRONIC PAIN SYNDROME: Primary | ICD-10-CM

## 2022-06-23 DIAGNOSIS — M54.50 CHRONIC MIDLINE LOW BACK PAIN WITHOUT SCIATICA: ICD-10-CM

## 2022-06-23 DIAGNOSIS — Z87.820 HISTORY OF TRAUMATIC BRAIN INJURY: ICD-10-CM

## 2022-06-23 DIAGNOSIS — Z79.891 LONG-TERM CURRENT USE OF OPIATE ANALGESIC: ICD-10-CM

## 2022-06-23 DIAGNOSIS — F11.220 OPIOID DEPENDENCE WITH UNCOMPLICATED INTOXICATION (HCC): ICD-10-CM

## 2022-06-23 DIAGNOSIS — G89.29 CHRONIC MIDLINE LOW BACK PAIN WITHOUT SCIATICA: ICD-10-CM

## 2022-06-23 PROCEDURE — 4004F PT TOBACCO SCREEN RCVD TLK: CPT

## 2022-06-23 PROCEDURE — 3008F BODY MASS INDEX DOCD: CPT

## 2022-06-23 PROCEDURE — 99214 OFFICE O/P EST MOD 30 MIN: CPT

## 2022-06-23 RX ORDER — OXYCODONE HYDROCHLORIDE 5 MG/1
TABLET ORAL
Qty: 120 TABLET | Refills: 0 | Status: SHIPPED | OUTPATIENT
Start: 2022-08-10

## 2022-06-23 RX ORDER — OXYCODONE HYDROCHLORIDE 5 MG/1
TABLET ORAL
Qty: 120 TABLET | Refills: 0 | Status: SHIPPED | OUTPATIENT
Start: 2022-07-11

## 2022-06-23 NOTE — PATIENT INSTRUCTIONS
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
## 2022-06-25 LAB
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
## 2022-08-26 ENCOUNTER — OFFICE VISIT (OUTPATIENT)
Dept: PAIN MEDICINE | Facility: CLINIC | Age: 42
End: 2022-08-26
Payer: COMMERCIAL

## 2022-08-26 VITALS
HEIGHT: 71 IN | DIASTOLIC BLOOD PRESSURE: 83 MMHG | HEART RATE: 93 BPM | SYSTOLIC BLOOD PRESSURE: 126 MMHG | BODY MASS INDEX: 35.45 KG/M2

## 2022-08-26 DIAGNOSIS — G89.4 CHRONIC PAIN SYNDROME: Primary | ICD-10-CM

## 2022-08-26 DIAGNOSIS — Z87.820 HISTORY OF TRAUMATIC BRAIN INJURY: ICD-10-CM

## 2022-08-26 DIAGNOSIS — M54.50 CHRONIC MIDLINE LOW BACK PAIN WITHOUT SCIATICA: ICD-10-CM

## 2022-08-26 DIAGNOSIS — Z79.891 LONG-TERM CURRENT USE OF OPIATE ANALGESIC: ICD-10-CM

## 2022-08-26 DIAGNOSIS — F11.20 UNCOMPLICATED OPIOID DEPENDENCE (HCC): ICD-10-CM

## 2022-08-26 DIAGNOSIS — G89.29 CHRONIC MIDLINE LOW BACK PAIN WITHOUT SCIATICA: ICD-10-CM

## 2022-08-26 PROBLEM — E66.01 OBESITY, MORBID (HCC): Status: ACTIVE | Noted: 2022-08-26

## 2022-08-26 PROCEDURE — 99214 OFFICE O/P EST MOD 30 MIN: CPT

## 2022-08-26 RX ORDER — BENAZEPRIL HYDROCHLORIDE 20 MG/1
20 TABLET ORAL DAILY
COMMUNITY
Start: 2022-07-20

## 2022-08-26 RX ORDER — OXYCODONE HYDROCHLORIDE 5 MG/1
TABLET ORAL
Qty: 120 TABLET | Refills: 0 | Status: SHIPPED | OUTPATIENT
Start: 2022-10-09 | End: 2022-10-18 | Stop reason: SDUPTHER

## 2022-08-26 RX ORDER — OXYCODONE HYDROCHLORIDE 5 MG/1
TABLET ORAL
Qty: 120 TABLET | Refills: 0 | Status: SHIPPED | OUTPATIENT
Start: 2022-09-09 | End: 2022-10-18 | Stop reason: SDUPTHER

## 2022-08-26 NOTE — PATIENT INSTRUCTIONS

## 2022-09-09 ENCOUNTER — TELEPHONE (OUTPATIENT)
Dept: PAIN MEDICINE | Facility: CLINIC | Age: 42
End: 2022-09-09

## 2022-09-09 NOTE — TELEPHONE ENCOUNTER
Patient's mother Lynnette Choudhary states that pharmacy is having verifying Keri Dryer information in their system & pt isn't able to fill his prescription for Roxicodone medication   Please reach out to her at # [phone], thx
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
## 2022-10-18 ENCOUNTER — OFFICE VISIT (OUTPATIENT)
Dept: PAIN MEDICINE | Facility: CLINIC | Age: 42
End: 2022-10-18
Payer: COMMERCIAL

## 2022-10-18 VITALS
BODY MASS INDEX: 35.84 KG/M2 | DIASTOLIC BLOOD PRESSURE: 108 MMHG | SYSTOLIC BLOOD PRESSURE: 174 MMHG | HEART RATE: 98 BPM | WEIGHT: 257 LBS

## 2022-10-18 DIAGNOSIS — F11.20 UNCOMPLICATED OPIOID DEPENDENCE (HCC): ICD-10-CM

## 2022-10-18 DIAGNOSIS — G89.4 CHRONIC PAIN SYNDROME: Primary | ICD-10-CM

## 2022-10-18 DIAGNOSIS — Z79.891 LONG-TERM CURRENT USE OF OPIATE ANALGESIC: ICD-10-CM

## 2022-10-18 DIAGNOSIS — Z87.820 HISTORY OF TRAUMATIC BRAIN INJURY: ICD-10-CM

## 2022-10-18 DIAGNOSIS — Z98.890 S/P CRANIOTOMY: ICD-10-CM

## 2022-10-18 DIAGNOSIS — M54.50 CHRONIC MIDLINE LOW BACK PAIN WITHOUT SCIATICA: ICD-10-CM

## 2022-10-18 DIAGNOSIS — G89.29 CHRONIC MIDLINE LOW BACK PAIN WITHOUT SCIATICA: ICD-10-CM

## 2022-10-18 PROCEDURE — 99214 OFFICE O/P EST MOD 30 MIN: CPT

## 2022-10-18 PROCEDURE — 80305 DRUG TEST PRSMV DIR OPT OBS: CPT

## 2022-10-18 RX ORDER — OXYCODONE HYDROCHLORIDE 10 MG/1
10 TABLET ORAL 3 TIMES DAILY PRN
Qty: 90 TABLET | Refills: 0 | Status: SHIPPED | OUTPATIENT
Start: 2022-10-18

## 2022-10-18 RX ORDER — OXYCODONE HYDROCHLORIDE 10 MG/1
10 TABLET ORAL 3 TIMES DAILY PRN
Qty: 90 TABLET | Refills: 0 | Status: SHIPPED | OUTPATIENT
Start: 2022-11-30

## 2022-10-18 NOTE — PATIENT INSTRUCTIONS

## 2022-10-22 LAB
6MAM UR QL CFM: NEGATIVE NG/ML
7AMINOCLONAZEPAM UR QL CFM: NEGATIVE NG/ML
A-OH ALPRAZ UR QL CFM: NEGATIVE NG/ML
AMPHET UR QL CFM: NEGATIVE NG/ML
AMPHET UR QL CFM: NEGATIVE NG/ML
BUPRENORPHINE UR QL CFM: NEGATIVE NG/ML
BUTALBITAL UR QL CFM: NEGATIVE NG/ML
BZE UR QL CFM: NEGATIVE NG/ML
CODEINE UR QL CFM: NEGATIVE NG/ML
EDDP UR QL CFM: NEGATIVE NG/ML
ETHYL GLUCURONIDE UR QL CFM: NEGATIVE NG/ML
ETHYL SULFATE UR QL SCN: NEGATIVE NG/ML
FENTANYL UR QL CFM: NEGATIVE NG/ML
GLIADIN IGG SER IA-ACNC: NEGATIVE NG/ML
HYDROCODONE UR QL CFM: NEGATIVE NG/ML
HYDROCODONE UR QL CFM: NEGATIVE NG/ML
HYDROMORPHONE UR QL CFM: NEGATIVE NG/ML
LORAZEPAM UR QL CFM: NEGATIVE NG/ML
MDMA UR QL CFM: NEGATIVE NG/ML
ME-PHENIDATE UR QL CFM: NEGATIVE NG/ML
MEPERIDINE UR QL CFM: NEGATIVE NG/ML
METHADONE UR QL CFM: NEGATIVE NG/ML
METHAMPHET UR QL CFM: NEGATIVE NG/ML
MORPHINE UR QL CFM: NEGATIVE NG/ML
MORPHINE UR QL CFM: NEGATIVE NG/ML
NORBUPRENORPHINE UR QL CFM: NEGATIVE NG/ML
NORDIAZEPAM UR QL CFM: NEGATIVE NG/ML
NORFENTANYL UR QL CFM: NEGATIVE NG/ML
NORHYDROCODONE UR QL CFM: NEGATIVE NG/ML
NORHYDROCODONE UR QL CFM: NEGATIVE NG/ML
NORMEPERIDINE UR QL CFM: NEGATIVE NG/ML
NOROXYCODONE UR QL CFM: NORMAL NG/ML
OXAZEPAM UR QL CFM: NEGATIVE NG/ML
OXYCODONE UR QL CFM: NORMAL NG/ML
OXYMORPHONE UR QL CFM: NORMAL NG/ML
OXYMORPHONE UR QL CFM: NORMAL NG/ML
PARA-FLUOROFENTANYL QUANTIFICATION: NORMAL NG/ML
PCP UR QL CFM: NEGATIVE NG/ML
PHENOBARB UR QL CFM: NEGATIVE NG/ML
RESULT ALL_PRESCRIBED MEDS AND SPECIAL INSTRUCTIONS: NORMAL
SECOBARBITAL UR QL CFM: NEGATIVE NG/ML
SL AMB 4-ANPP QUANTIFICATION: NORMAL NG/ML
SL AMB 5F-ADB-M7 METABOLITE QUANTIFICATION: NEGATIVE NG/ML
SL AMB 7-OH-MITRAGYNINE (KRATOM ALKALOID) QUANTIFICATION: NEGATIVE NG/ML
SL AMB AB-FUBINACA-M3 METABOLITE QUANTIFICATION: NEGATIVE NG/ML
SL AMB ACETYL FENTANYL QUANTIFICATION: NORMAL NG/ML
SL AMB ACETYL NORFENTANYL QUANTIFICATION: NORMAL NG/ML
SL AMB ACRYL FENTANYL QUANTIFICATION: NORMAL NG/ML
SL AMB CARFENTANIL QUANTIFICATION: NORMAL NG/ML
SL AMB CTHC (MARIJUANA METABOLITE) QUANTIFICATION: NEGATIVE NG/ML
SL AMB DEXTROMETHORPHAN QUANTIFICATION: NEGATIVE NG/ML
SL AMB DEXTRORPHAN (DEXTROMETHORPHAN METABOLITE) QUANT: NEGATIVE NG/ML
SL AMB DEXTRORPHAN (DEXTROMETHORPHAN METABOLITE) QUANT: NEGATIVE NG/ML
SL AMB JWH018 METABOLITE QUANTIFICATION: NEGATIVE NG/ML
SL AMB JWH073 METABOLITE QUANTIFICATION: NEGATIVE NG/ML
SL AMB MDMB-FUBINACA-M1 METABOLITE QUANTIFICATION: NEGATIVE NG/ML
SL AMB N-DESMETHYL-TRAMADOL QUANTIFICATION: NEGATIVE NG/ML
SL AMB PHENTERMINE QUANTIFICATION: NEGATIVE NG/ML
SL AMB RCS4 METABOLITE QUANTIFICATION: NEGATIVE NG/ML
SL AMB RITALINIC ACID QUANTIFICATION: NEGATIVE NG/ML
SPECIMEN DRAWN SERPL: NEGATIVE NG/ML
TAPENTADOL UR QL CFM: NEGATIVE NG/ML
TEMAZEPAM UR QL CFM: NEGATIVE NG/ML
TEMAZEPAM UR QL CFM: NEGATIVE NG/ML
TRAMADOL UR QL CFM: NEGATIVE NG/ML
URATE/CREAT 24H UR: NEGATIVE NG/ML

## 2022-11-25 ENCOUNTER — TELEPHONE (OUTPATIENT)
Dept: PAIN MEDICINE | Facility: CLINIC | Age: 42
End: 2022-11-25

## 2022-11-25 NOTE — TELEPHONE ENCOUNTER
S/w mother Leonarda Schmidt, informed her about MG's response  Leonarda Schmidt was away to [place] and pt could have taken more than he should  Pt has only 3 pills left  Pt starts getting nasty maybe from the pain  Leonarda Schmidt is concerned pt might go into withdrawal since next rf is not until 11/30, 5 days from now    Pls advise about withdrawal

## 2022-11-25 NOTE — TELEPHONE ENCOUNTER
Unfortunately, the patient will need  To wait until 11/30     There is a discrepancy with the medication count

## 2022-11-25 NOTE — TELEPHONE ENCOUNTER
S/w Jorge Lyman mother per [place]  As per Jorge Lyman, pt is missing 6 days worth of Roxicodone  Pt received a [facility] from [facility] on 10/18 that roxicodone is ready for pt to   Jorge Lyman says pt did end up picking the medicine at 10/22 although the DNFD is 10/31  Jorge Lyman wasn't with pt when he picked up roxicodone, and pt tends to get confused with history of brain injury  Spoken with Conception Franca from [facility] today, confirmed with her that [facility] did [facility] the [facility] to pt on 10/22  As per mom, pt divided the 10 mg tablet 4 times so the left over medicine will last on pt until 11/30 refil  Jorge Lyman didn't know where the rest of pt's roxicodone  Pls advise

## 2022-11-25 NOTE — TELEPHONE ENCOUNTER
S/w with Arlette San mother and advised same  Arlette San says she will try ibuprofen to take away the edge off  Stormy Gas to call our office if she notices signs and symptoms of withdrawal   Arlette San verbalized understanding

## 2022-11-25 NOTE — TELEPHONE ENCOUNTER
Caller: Joreg Lyman ( Mom)    Doctor: Pili MCCLELLAN    Reason for call: Medication   oxyCODONE (ROXICODONE) 10 MG TABS    Pt is missing 2 dosages     Call back#: [phone]

## 2022-11-25 NOTE — TELEPHONE ENCOUNTER
DIMAS Laureano is only up to 3 pm today  Pls advise below regarding possible withdrawal, pt has only 3 pills left, roxicodone next rf date is 11/30  Unknown if ever smoked

## 2022-11-26 NOTE — TELEPHONE ENCOUNTER
Patient's mother called in today regarding her son in a lot of pain due to not having his pain medication  She will taking her son to the ED to see if they will be able to help with the withdrawal and pain

## 2022-12-01 NOTE — TELEPHONE ENCOUNTER
Letter drafted and signed by Daniel Larios  To be sent Certified  Given to [address] [place] [address] team  Practice Administrator emailed to place flag in chart for do not schedule  Appt cancelled for December

## 2022-12-01 NOTE — TELEPHONE ENCOUNTER
Caller: pt    Doctor: Gabriel Berrios    Reason for call: Please reach back out to patient      Call back#: [phone]

## 2022-12-01 NOTE — TELEPHONE ENCOUNTER
S/W pt who was asking if he could still come to his appt in December  Advised appt cancelled and list was sent for other pain Dr's in his area

## 2022-12-01 NOTE — TELEPHONE ENCOUNTER
S/W Jon Esparza, pts legal guardian and explained due to overuse of medications and that being a safety issue, pt is being discharged from the practice  Per PDMP, pt had #120 5mg tabs on 10/9 and picked up #90 10mg tabs on 10/18  Advised taking 5mg tabs TID as of 10/9, pt should have had #90 left on 10/18 which should have lasted him until 10/31 even taking two 5mg tabs daily  Next Rx should have started 10/31 and lasted until Rx on 11/30, however he was out prior and had an extra 12 tabs Rx'd from ED      Advised 11/30 Rx picked up yesterday would be last   Advised will received certified letter  Advised will receive list of [place] area pain providers with letter    Jon Esparza verbalized understanding
